# Patient Record
Sex: MALE | Race: WHITE | Employment: OTHER | URBAN - METROPOLITAN AREA
[De-identification: names, ages, dates, MRNs, and addresses within clinical notes are randomized per-mention and may not be internally consistent; named-entity substitution may affect disease eponyms.]

---

## 2018-05-23 ENCOUNTER — HOSPITAL ENCOUNTER (OUTPATIENT)
Dept: CT IMAGING | Age: 73
Discharge: HOME | End: 2018-05-23
Attending: INTERNAL MEDICINE
Payer: MEDICARE

## 2018-05-23 DIAGNOSIS — R19.7 DIARRHEA: ICD-10-CM

## 2018-05-23 DIAGNOSIS — R63.0 ANOREXIA: ICD-10-CM

## 2018-05-23 DIAGNOSIS — F17.200 NICOTINE DEPENDENCE: ICD-10-CM

## 2018-05-23 DIAGNOSIS — R63.4 ABNORMAL WEIGHT LOSS: ICD-10-CM

## 2018-05-23 LAB — CREAT UR-MCNC: 0.8 MG/DL (ref 0.6–1.3)

## 2018-05-23 PROCEDURE — 74011636320 HC RX REV CODE- 636/320: Performed by: INTERNAL MEDICINE

## 2018-05-23 PROCEDURE — 74177 CT ABD & PELVIS W/CONTRAST: CPT

## 2018-05-23 PROCEDURE — 82565 ASSAY OF CREATININE: CPT

## 2018-05-23 RX ADMIN — IOPAMIDOL 100 ML: 612 INJECTION, SOLUTION INTRAVENOUS at 15:00

## 2018-06-29 ENCOUNTER — HOSPITAL ENCOUNTER (OUTPATIENT)
Dept: GENERAL RADIOLOGY | Age: 73
Discharge: HOME | End: 2018-06-29
Attending: INTERNAL MEDICINE
Payer: MEDICARE

## 2018-06-29 DIAGNOSIS — Z86.010 PERSONAL HISTORY OF COLONIC POLYPS: ICD-10-CM

## 2018-06-29 DIAGNOSIS — R93.3 ABNORMAL FINDING ON GI TRACT IMAGING: ICD-10-CM

## 2018-06-29 PROCEDURE — 74011000255 HC RX REV CODE- 255: Performed by: INTERNAL MEDICINE

## 2018-06-29 PROCEDURE — 74011000250 HC RX REV CODE- 250: Performed by: INTERNAL MEDICINE

## 2018-06-29 PROCEDURE — 74247 XR UPPER GI W KUB AIR CONT: CPT

## 2018-06-29 RX ADMIN — ANTACID/ANTIFLATULENT 8 G: 380; 550; 10; 10 GRANULE, EFFERVESCENT ORAL at 08:00

## 2018-06-29 RX ADMIN — BARIUM SULFATE 135 ML: 980 POWDER, FOR SUSPENSION ORAL at 08:00

## 2018-06-29 RX ADMIN — BARIUM SULFATE 30 G: 960 POWDER, FOR SUSPENSION ORAL at 08:00

## 2018-06-29 RX ADMIN — BARIUM SULFATE 700 MG: 700 TABLET ORAL at 08:00

## 2018-08-29 ENCOUNTER — ANESTHESIA EVENT (OUTPATIENT)
Dept: ENDOSCOPY | Age: 73
End: 2018-08-29
Payer: MEDICARE

## 2018-08-30 ENCOUNTER — HOSPITAL ENCOUNTER (OUTPATIENT)
Age: 73
Setting detail: OUTPATIENT SURGERY
Discharge: HOME | End: 2018-08-30
Attending: INTERNAL MEDICINE | Admitting: INTERNAL MEDICINE
Payer: MEDICARE

## 2018-08-30 ENCOUNTER — ANESTHESIA (OUTPATIENT)
Dept: ENDOSCOPY | Age: 73
End: 2018-08-30
Payer: MEDICARE

## 2018-08-30 VITALS
HEART RATE: 67 BPM | DIASTOLIC BLOOD PRESSURE: 73 MMHG | RESPIRATION RATE: 19 BRPM | SYSTOLIC BLOOD PRESSURE: 153 MMHG | BODY MASS INDEX: 29.54 KG/M2 | TEMPERATURE: 98 F | OXYGEN SATURATION: 98 % | WEIGHT: 211 LBS | HEIGHT: 71 IN

## 2018-08-30 LAB — GLUCOSE BLD STRIP.AUTO-MCNC: 116 MG/DL (ref 70–110)

## 2018-08-30 PROCEDURE — 82962 GLUCOSE BLOOD TEST: CPT

## 2018-08-30 PROCEDURE — 77030014179 HC APPL CLP RESOL BSC -C: Performed by: INTERNAL MEDICINE

## 2018-08-30 PROCEDURE — 74011000250 HC RX REV CODE- 250: Performed by: NURSE ANESTHETIST, CERTIFIED REGISTERED

## 2018-08-30 PROCEDURE — 77030009426 HC FCPS BIOP ENDOSC BSC -B: Performed by: INTERNAL MEDICINE

## 2018-08-30 PROCEDURE — 76060000032 HC ANESTHESIA 0.5 TO 1 HR: Performed by: INTERNAL MEDICINE

## 2018-08-30 PROCEDURE — 74011250636 HC RX REV CODE- 250/636

## 2018-08-30 PROCEDURE — 74011250636 HC RX REV CODE- 250/636: Performed by: NURSE ANESTHETIST, CERTIFIED REGISTERED

## 2018-08-30 PROCEDURE — 76040000007: Performed by: INTERNAL MEDICINE

## 2018-08-30 PROCEDURE — 88305 TISSUE EXAM BY PATHOLOGIST: CPT | Performed by: INTERNAL MEDICINE

## 2018-08-30 PROCEDURE — 77030013992 HC SNR POLYP ENDOSC BSC -B: Performed by: INTERNAL MEDICINE

## 2018-08-30 RX ORDER — LIDOCAINE HYDROCHLORIDE 20 MG/ML
INJECTION, SOLUTION EPIDURAL; INFILTRATION; INTRACAUDAL; PERINEURAL AS NEEDED
Status: DISCONTINUED | OUTPATIENT
Start: 2018-08-30 | End: 2018-08-30 | Stop reason: HOSPADM

## 2018-08-30 RX ORDER — PROPOFOL 10 MG/ML
INJECTION, EMULSION INTRAVENOUS AS NEEDED
Status: DISCONTINUED | OUTPATIENT
Start: 2018-08-30 | End: 2018-08-30 | Stop reason: HOSPADM

## 2018-08-30 RX ORDER — METFORMIN HYDROCHLORIDE 500 MG/1
500 TABLET ORAL 2 TIMES DAILY WITH MEALS
COMMUNITY

## 2018-08-30 RX ORDER — SODIUM CHLORIDE, SODIUM LACTATE, POTASSIUM CHLORIDE, CALCIUM CHLORIDE 600; 310; 30; 20 MG/100ML; MG/100ML; MG/100ML; MG/100ML
75 INJECTION, SOLUTION INTRAVENOUS CONTINUOUS
Status: DISCONTINUED | OUTPATIENT
Start: 2018-08-30 | End: 2018-08-30 | Stop reason: HOSPADM

## 2018-08-30 RX ORDER — SODIUM CHLORIDE 0.9 % (FLUSH) 0.9 %
5-10 SYRINGE (ML) INJECTION AS NEEDED
Status: DISCONTINUED | OUTPATIENT
Start: 2018-08-30 | End: 2018-08-30 | Stop reason: HOSPADM

## 2018-08-30 RX ORDER — SODIUM CHLORIDE 0.9 % (FLUSH) 0.9 %
5-10 SYRINGE (ML) INJECTION EVERY 8 HOURS
Status: DISCONTINUED | OUTPATIENT
Start: 2018-08-30 | End: 2018-08-30 | Stop reason: HOSPADM

## 2018-08-30 RX ADMIN — PROPOFOL 50 MG: 10 INJECTION, EMULSION INTRAVENOUS at 13:48

## 2018-08-30 RX ADMIN — SODIUM CHLORIDE, SODIUM LACTATE, POTASSIUM CHLORIDE, AND CALCIUM CHLORIDE 75 ML/HR: 600; 310; 30; 20 INJECTION, SOLUTION INTRAVENOUS at 12:24

## 2018-08-30 RX ADMIN — PROPOFOL 50 MG: 10 INJECTION, EMULSION INTRAVENOUS at 14:00

## 2018-08-30 RX ADMIN — PROPOFOL 50 MG: 10 INJECTION, EMULSION INTRAVENOUS at 13:57

## 2018-08-30 RX ADMIN — FAMOTIDINE 20 MG: 10 INJECTION, SOLUTION INTRAVENOUS at 12:24

## 2018-08-30 RX ADMIN — PROPOFOL 50 MG: 10 INJECTION, EMULSION INTRAVENOUS at 14:22

## 2018-08-30 RX ADMIN — PROPOFOL 50 MG: 10 INJECTION, EMULSION INTRAVENOUS at 13:45

## 2018-08-30 RX ADMIN — PROPOFOL 50 MG: 10 INJECTION, EMULSION INTRAVENOUS at 14:10

## 2018-08-30 RX ADMIN — PROPOFOL 50 MG: 10 INJECTION, EMULSION INTRAVENOUS at 13:52

## 2018-08-30 RX ADMIN — LIDOCAINE HYDROCHLORIDE 40 MG: 20 INJECTION, SOLUTION EPIDURAL; INFILTRATION; INTRACAUDAL; PERINEURAL at 13:45

## 2018-08-30 RX ADMIN — PROPOFOL 50 MG: 10 INJECTION, EMULSION INTRAVENOUS at 14:05

## 2018-08-30 RX ADMIN — PROPOFOL 50 MG: 10 INJECTION, EMULSION INTRAVENOUS at 13:50

## 2018-08-30 NOTE — IP AVS SNAPSHOT
[address] [facility] 
 
 
 [address] [id number] [phone] Patient: [facility] MRN: [id number] ADT:6/4/9698 About your hospitalization You were admitted on:  August 30, 2018 You last received care in the:  [facility] You were discharged on:  August 30, 2018 Why you were hospitalized Your primary diagnosis was:  Not on File Follow-up Information Follow up With Details Comments Contact Info Keven Peterson MD   [address] [place] [address] [address] [facility] 
[phone] Martina Balbuena MD   [address] [address] 
[phone] Discharge Orders None A check louie indicates which time of day the medication should be taken. My Medications CONTINUE taking these medications Instructions Each Dose to Equal  
 Morning Noon Evening Bedtime  
 allopurinol 100 mg tablet Commonly known as:  Gaylan Elders Your last dose was: Your next dose is: Take 100 mg by mouth daily. 100 mg  
    
   
   
   
  
 aspirin 81 mg tablet Your last dose was: Your next dose is: Take 81 mg by mouth. 81 mg  
    
   
   
   
  
 atorvastatin 10 mg tablet Commonly known as:  LIPITOR Your last dose was: Your next dose is: Take 10 mg by mouth daily. 10 mg  
    
   
   
   
  
 CRESTOR 10 mg tablet Generic drug:  rosuvastatin Your last dose was: Your next dose is: Take 10 mg by mouth nightly. 10 mg  
    
   
   
   
  
 metFORMIN 500 mg tablet Commonly known as:  GLUCOPHAGE Your last dose was: Your next dose is: Take 500 mg by mouth two (2) times daily (with meals). Indications: 1/2 tablet in AM, 1 tablet in PM  
 500 mg Discharge Instructions Colonoscopy: What to Expect at [facility] Your Recovery After you have a colonoscopy, you will stay at the clinic for 1 to 2 hours until the medicines wear off. Then you can go home. But you will need to arrange for a ride. Your doctor will tell you when you can eat and do your other usual activities. Your doctor will talk to you about when you will need your next colonoscopy. Your doctor can help you decide how often you need to be checked. This will depend on the results of your test and your risk for colorectal cancer. After the test, you may be bloated or have gas pains. You may need to pass gas. If a biopsy was done or a polyp was removed, you may have streaks of blood in your stool (feces) for a few days. This care sheet gives you a general idea about how long it will take for you to recover. But each person recovers at a different pace. Follow the steps below to get better as quickly as possible. How can you care for yourself at home? Activity · Rest when you feel tired. · You can do your normal activities when it feels okay to do so. Diet · Follow your doctor's directions for eating. · Unless your doctor has told you not to, drink plenty of fluids. This helps to replace the fluids that were lost during the colon prep. · Do not drink alcohol. Medicines · If polyps were removed or a biopsy was done during the test, your doctor may tell you not to take aspirin or other anti-inflammatory medicines for a few days. These include ibuprofen (Advil, Motrin) and naproxen (Aleve). Other instructions · For your safety, do not drive or operate machinery until the medicine wears off and you can think clearly. Your doctor may tell you not to drive or operate machinery until the day after your test. 
· Do not sign legal documents or make major decisions until the medicine wears off and you can think clearly. The anesthesia can make it hard for you to fully understand what you are agreeing to. Follow-up care is a key part of your treatment and safety. Be sure to make and go to all appointments, and call your doctor if you are having problems. It's also a good idea to know your test results and keep a list of the medicines you take. When should you call for help? Call 911 anytime you think you may need emergency care. For example, call if: 
· You passed out (lost consciousness). · You pass maroon or bloody stools. · You have severe belly pain. Call your doctor now or seek immediate medical care if: 
· Your stools are black and tarlike. · Your stools have streaks of blood, but you did not have a biopsy or any polyps removed. · You have belly pain, or your belly is swollen and firm. · You vomit. · You have a fever. · You are very dizzy. Watch closely for changes in your health, and be sure to contact your doctor if you have any problems. Where can you learn more? Go to Keoghs.be Enter E264 in the search box to learn more about \"Colonoscopy: What to Expect at Home. \"  
© 9797-0268 Healthwise, Incorporated. Care instructions adapted under license by [facility] [employer] (which disclaims liability or warranty for this information). This care instruction is for use with your licensed healthcare professional. If you have questions about a medical condition or this instruction, always ask your healthcare professional. [place] [address] any warranty or liability for your use of this information. Content Version: 07.3.506740; Current as of: November 14, 2014 Colon Polyps: Care Instructions Your Care Instructions Colon polyps are growths in the colon or the rectum. The cause of most colon polyps is not known, and most people who get them do not have any problems. But a certain kind can turn into cancer. For this reason, regular testing for colon polyps is important for people age 48 and older and anyone who has an increased risk for colon cancer. Polyps are usually found through routine colon cancer screening tests. Although most colon polyps are not cancerous, they are usually removed and then tested for cancer. Screening for colon cancer saves lives because the cancer can usually be cured if it is caught early. If you have a polyp that is the type that can turn into cancer, you may need more tests to examine your entire colon. The doctor will remove any other polyps that he or she finds, and you will be tested more often. Follow-up care is a key part of your treatment and safety. Be sure to make and go to all appointments, and call your doctor if you are having problems. It's also a good idea to know your test results and keep a list of the medicines you take. How can you care for yourself at home? Regular exams to look for colon polyps are the best way to prevent polyps from turning into colon cancer. These can include stool tests, sigmoidoscopy, colonoscopy, and CT colonography. Talk with your doctor about a testing schedule that is right for you. To prevent polyps There is no home treatment that can prevent colon polyps. But these steps may help lower your risk for cancer. · Stay active. Being active can help you get to and stay at a healthy weight. Try to exercise on most days of the week. Walking is a good choice. · Eat well. Choose a variety of vegetables, fruits, legumes (such as peas and beans), fish, poultry, and whole grains. · Do not smoke. If you need help quitting, talk to your doctor about stop-smoking programs and medicines. These can increase your chances of quitting for good. · If you drink alcohol, limit how much you drink. Limit alcohol to 2 drinks a day for men and 1 drink a day for women. When should you call for help?  
Call your doctor now or seek immediate medical care if: 
  · You have severe belly pain.  
  · Your stools are maroon or very bloody.  
 Watch closely for changes in your health, and be sure to contact your doctor if: 
  · You have a fever.  
  · You have nausea or vomiting.  
  · You have a change in bowel habits (new constipation or diarrhea).  
  · Your symptoms get worse or are not improving as expected. Where can you learn more? Go to http://onrma-meri.info/. Enter 95 [id number] in the search box to learn more about \"Colon Polyps: Care Instructions. \" Current as of: May 12, 2017 Content Version: 11.7 © 9259-1927 Pressi. Care instructions adapted under license by [employer] (which disclaims liability or warranty for this information). If you have questions about a medical condition or this instruction, always ask your healthcare professional. [place] [address] any warranty or liability for your use of this information. DISCHARGE SUMMARY from Nurse POST-PROCEDURE INSTRUCTIONS: 
 
Call your Physician if you: 
? Observe any excess bleeding. ? Develop a temperature over 100.5o F. 
? Experience abdominal, shoulder or chest pain. ? Notice any signs of decreased circulation or nerve impairment to an extremity such as a change in color, persistent numbness, tingling, coldness or increase in pain. ? Vomit blood or you have nausea and vomiting lasting longer than 4 hours. ? Are unable to take medications. ? Are unable to urinate within 8 hours after discharge following general anesthesia or intravenous sedation. For the next 24 hours after receiving general anesthesia or intravenous sedation, or while taking prescription Narcotics, limit your activities: 
? Do NOT drive a motor vehicle, operate hazard machinery or power tools, or perform tasks that require coordination. The medication you received during your procedure may have some effect on your mental awareness. ? Do NOT make important personal or business decisions. The medication you received during your procedure may have some effect on your mental awareness. ? Do NOT drink alcoholic beverages. These drinks do not mix well with the medications that have been given to you. ? Upon discharge from the hospital, you must be accompanied by a responsible adult. ? Resume your diet as directed by your physician. ? Resume medications as your physician has prescribed. ? Please give a list of your current medications to your Primary Care Provider. ? Please update this list whenever your medications are discontinued, doses are changed, or new medications (including over-the-counter products) are added. ? Please carry medication information at all times in case of emergency situations. These are general instructions for a healthy lifestyle: No smoking/ No tobacco products/ Avoid exposure to second hand smoke. ? Surgeon General's Warning:  Quitting smoking now greatly reduces serious risk to your health. Obesity, smoking, and a sedentary lifestyle greatly increase your risk for illness. ? A healthy diet, regular physical exercise & weight monitoring are important for maintaining a healthy lifestyle ? You may be retaining fluid if you have a history of heart failure or if you experience any of the following symptoms:  Weight gain of 3 pounds or more overnight or 5 pounds in a week, increased swelling in our hands or feet or shortness of breath while lying flat in bed. Please call your doctor as soon as you notice any of these symptoms; do not wait until your next office visit. Recognize signs and symptoms of STROKE: 
F  -  Face looks uneven A  -  Arms unable to move or move unevenly S  -  Speech slurred or non-existent T  -  Time to call 911 - as soon as signs and symptoms begin - DO NOT go back to bed or wait to see If you get better - TIME IS BRAIN. Colorectal Screening ? Colorectal cancer almost always develops from precancerous polyps (abnormal growths) in the colon or rectum.   Screening tests can find precancerous polyps, so that they can be removed before they turn into cancer. Screening tests can also find colorectal cancer early, when treatment works best. 
? Speak with your physician about when you should begin screening and how often you should be tested. Additional Information If you have questions, please call [phone]. Remember, [employer] is NOT to be used for urgent needs. For medical emergencies, dial 911. Educational references and/or instructions provided during this visit included: 
 
Colon Polyps APPOINTMENTS: 
 
Please make a follow-up appointment with your physician. Discharge information has been reviewed with the patient and family. The patient verbalized understanding. Introducing [facility]! Ramin Day introduces [employer] patient portal. Now you can access parts of your medical record, email your doctor's office, and request medication refills online. 1. In your internet browser, go to [email or web address] 2. Click on the First Time User? Click Here link in the Sign In box. You will see the New Member Sign Up page. 3. Enter your [employer] Access Code exactly as it appears below. You will not need to use this code after youve completed the sign-up process. If you do not sign up before the expiration date, you must request a new code. · [employer] Access Code: [email or web address] Expires: 11/28/2018  2:37 PM 
 
4. Enter the last four digits of your Social Security Number (xxxx) and Date of Birth (mm/dd/yyyy) as indicated and click Submit. You will be taken to the next sign-up page. 5. Create a Adzunat ID. This will be your [employer] login ID and cannot be changed, so think of one that is secure and easy to remember. 6. Create a [employer] password. You can change your password at any time. 7. Enter your Password Reset Question and Answer. This can be used at a later time if you forget your password. 8. Enter your e-mail address. You will receive e-mail notification when new information is available in [address] 9. Click Sign Up. You can now view and download portions of your medical record. 10. Click the Download Summary menu link to download a portable copy of your medical information. If you have questions, please visit the Frequently Asked Questions section of the D[employer] website. Remember, [employer] is NOT to be used for urgent needs. For medical emergencies, dial 911. Now available from your iPhone and Android! Introducing Alexandro Dolan As a [facility] patient, I wanted to make you aware of our electronic visit tool called Alexandro Dolan. Rachel ClipMinemichelle 24/7 allows you to connect within minutes with a medical provider 24 hours a day, seven days a week via a mobile device or tablet or logging into a secure website from your computer. You can access Alexandro Dolan from anywhere in the [place]. A virtual visit might be right for you when you have a simple condition and feel like you just dont want to get out of bed, or cant get away from work for an appointment, when your regular [facility] provider is not available (evenings, weekends or holidays), or when youre out of town and need minor care. Electronic visits cost only $49 and if the [facility] No 24/7 provider determines a prescription is needed to treat your condition, one can be electronically transmitted to a nearby pharmacy*. Please take a moment to enroll today if you have not already done so. The enrollment process is free and takes just a few minutes. To enroll, please download the [employer]/compropago edison to your tablet or phone, or visit [email or web address] to enroll on your computer.    
And, as an [address] [place] [address] patient with a Magine account, the results of your visits will be scanned into your electronic medical record and your primary care provider will be able to view the scanned results. We urge you to continue to see your regular [employer] provider for your ongoing medical care. And while your primary care provider may not be the one available when you seek a Nitch virtual visit, the peace of mind you get from getting a real diagnosis real time can be priceless. For more information on Nitch, view our Frequently Asked Questions (FAQs) at [email or web address]. Sincerely, 
 
Cristino Blankenship MD 
Chief Medical Officer [address] *:  certain medications cannot be prescribed via Nitch Providers Seen During Your Hospitalization Provider Specialty Primary office phone Funmilayo Earl MD Gastroenterology [phone] Your Primary Care Physician (PCP) Primary Care Physician Office Phone Office Fax Vu Zamorano [phone] You are allergic to the following No active allergies Recent Documentation Height Weight BMI Smoking Status 1.803 m 95.7 kg 29.43 kg/m2 Former Smoker Emergency Contacts Name Discharge Info Relation Home Work Mobile Shaniqua Wang DISCHARGE CAREGIVER [3] Child [2] [phone] Mira Deal N/A  AT THIS TIME [6] Friend [5] [phone] Patient Belongings The following personal items are in your possession at time of discharge: 
  Dental Appliances: Partials, Lowers, Uppers  Visual Aid: None Please provide this summary of care documentation to your next provider. Signatures-by signing, you are acknowledging that this After Visit Summary has been reviewed with you and you have received a copy. Patient Signature:  ____________________________________________________________ Date:  ____________________________________________________________  
  
[facility]  Provider Signature: ____________________________________________________________ Date:  ____________________________________________________________

## 2018-08-30 NOTE — ANESTHESIA PREPROCEDURE EVALUATION
Anesthetic History No history of anesthetic complications Review of Systems / Medical History Patient summary reviewed and pertinent labs reviewed Pulmonary Within defined limits Sleep apnea Neuro/Psych Within defined limits Cardiovascular Within defined limits Exercise tolerance: >4 METS 
  
GI/Hepatic/Renal 
Within defined limits GERD Endo/Other Within defined limits Other Findings Comments: Documentation of current medication Current medications obtained, documented and obtained? YES Risk Factors for Postoperative nausea/vomiting: 
     History of postoperative nausea/vomiting? NO Female? NO Motion sickness? NO Intended opioid administration for postoperative analgesia? NO Smoking Abstinence: 
Current Smoker? NO Elective Surgery? YES Seen preoperatively by anesthesiologist or proxy prior to day of surgery? YES Pt abstained from smoking 24 hours prior to anesthesia? YES Preventive care/screening for High Blood Pressure: 
Aged 18 years and older: YES Screened for high blood pressure: YES Patients with high blood pressure referred to primary care provider 
 for BP management: YES Physical Exam 
 
Airway Mallampati: II 
TM Distance: 4 - 6 cm Neck ROM: normal range of motion Mouth opening: Normal 
 
 Cardiovascular Rhythm: regular Rate: normal 
 
 
 
 Dental 
 
Dentition: Upper partial plate and Lower partial plate Pulmonary Breath sounds clear to auscultation Abdominal 
GI exam deferred Other Findings Anesthetic Plan ASA: 3 Anesthesia type: MAC Induction: Intravenous Anesthetic plan and risks discussed with: Patient

## 2018-08-30 NOTE — PROCEDURES
[email or web address]  [phone]        Brief Procedure Note    Livan Calvin  1945  [id number]    Date of Procedure: 8/30/2018    Preoperative diagnosis:   V12.72 - Z86.010, Personal history of colon polyps    Postoperative diagnosis: ascending polyp x 1, hepatic flexure polyps x 2 with single endoclip , transverse polyps x 3, descending polyps x 2 , multiple rectal polyps (4 removed for sampling), diverticulosis, internal and external hemorrhoids    Description of Findings: same    Sedation/Anesthesia: Monitored Anesthesia Care; See Anesthesia Note    Procedure: Procedure(s):  COLONOSCOPY with polypectomies and biopsy with clip     :  Dr. Joaquin Jacinto MD    Assistant(s): Endoscopy Technician-1: dAalid Mcqueen RN-1: Sonny Lyons RN  Endoscopy RN-2: Verónica Moreno RN    EBL:None    Specimens:   ID Type Source Tests Collected by Time Destination   1 : ascending polyp Preservative Colon, Ascending  Joaquin Jacinto MD 8/30/2018 1357 Pathology   2 : hepatic flexure polyps Preservative Hepatic Flexure  Joaquin Jacinto MD 8/30/2018 1400 Pathology   3 : transverse polyps Preservative Colon, Transverse  Joaquin Jacinto MD 8/30/2018 1404 Pathology   4 : descending polyps Preservative Colon, Descending  Joaquin Jacinto MD 8/30/2018 1410 Pathology   5 : rectum polyps Preservative Rectum  Joaquin Jacinto MD 8/30/2018 1419 Pathology       Findings: See printed and scanned procedure note    Complications: None    Dr. Joaquin Jacinto MD  8/30/2018  2:30 PM    Joaquin Jacinto MD  [facility] [address] [phone]  [email or web address]

## 2018-08-30 NOTE — ANESTHESIA POSTPROCEDURE EVALUATION
Post-Anesthesia Evaluation and Assessment Patient: Vero Richter MRN: [id number]  SSN: [id number] YOB: 1945  Age: 75 y.o. Sex: male Cardiovascular Function/Vital Signs Visit Vitals  /55  Pulse 73  Temp 36.7 °C (98 °F)  Resp 12  Ht 5' 11\" (1.803 m)  Wt 95.7 kg (211 lb)  SpO2 100%  BMI 29.43 kg/m2 Patient is status post MAC anesthesia for Procedure(s): 
COLONOSCOPY with polypectomies and biopsy with clip . Nausea/Vomiting: None Postoperative hydration reviewed and adequate. Pain: 
Pain Scale 1: Visual (08/30/18 1428) Pain Intensity 1: 0 (08/30/18 1428) Managed Neurological Status: At baseline Mental Status and Level of Consciousness: Arousable Pulmonary Status:  
O2 Device: Room air (08/30/18 1429) Adequate oxygenation and airway patent Complications related to anesthesia: None Post-anesthesia assessment completed. No concerns Signed By: Bita Nicolas MD   
 August 30, 2018

## 2018-08-30 NOTE — DISCHARGE INSTRUCTIONS
Colonoscopy: What to Expect at [address] [place] [address]  After you have a colonoscopy, you will stay at the clinic for 1 to 2 hours until the medicines wear off. Then you can go home. But you will need to arrange for a ride. Your doctor will tell you when you can eat and do your other usual activities. Your doctor will talk to you about when you will need your next colonoscopy. Your doctor can help you decide how often you need to be checked. This will depend on the results of your test and your risk for colorectal cancer. After the test, you may be bloated or have gas pains. You may need to pass gas. If a biopsy was done or a polyp was removed, you may have streaks of blood in your stool (feces) for a few days. This care sheet gives you a general idea about how long it will take for you to recover. But each person recovers at a different pace. Follow the steps below to get better as quickly as possible. How can you care for yourself at home? Activity  · Rest when you feel tired. · You can do your normal activities when it feels okay to do so. Diet  · Follow your doctor's directions for eating. · Unless your doctor has told you not to, drink plenty of fluids. This helps to replace the fluids that were lost during the colon prep. · Do not drink alcohol. Medicines  · If polyps were removed or a biopsy was done during the test, your doctor may tell you not to take aspirin or other anti-inflammatory medicines for a few days. These include ibuprofen (Advil, Motrin) and naproxen (Aleve). Other instructions  · For your safety, do not drive or operate machinery until the medicine wears off and you can think clearly. Your doctor may tell you not to drive or operate machinery until the day after your test.  · Do not sign legal documents or make major decisions until the medicine wears off and you can think clearly. The anesthesia can make it hard for you to fully understand what you are agreeing to.   Follow-up care is a key part of your treatment and safety. Be sure to make and go to all appointments, and call your doctor if you are having problems. It's also a good idea to know your test results and keep a list of the medicines you take. When should you call for help? Call 911 anytime you think you may need emergency care. For example, call if:  · You passed out (lost consciousness). · You pass maroon or bloody stools. · You have severe belly pain. Call your doctor now or seek immediate medical care if:  · Your stools are black and tarlike. · Your stools have streaks of blood, but you did not have a biopsy or any polyps removed. · You have belly pain, or your belly is swollen and firm. · You vomit. · You have a fever. · You are very dizzy. Watch closely for changes in your health, and be sure to contact your doctor if you have any problems. Where can you learn more? Go to Invincea.be  Enter E264 in the search box to learn more about \"Colonoscopy: What to Expect at Home. \"   © 3641-9621 [employer], [employer]. Care instructions adapted under license by Vandana Stevenson (which disclaims liability or warranty for this information). This care instruction is for use with your licensed healthcare professional. If you have questions about a medical condition or this instruction, always ask your healthcare professional. [address] any warranty or liability for your use of this information. Content Version: 03.0.307461; Current as of: November 14, 2014     Colon Polyps: Care Instructions  Your Care Instructions    Colon polyps are growths in the colon or the rectum. The cause of most colon polyps is not known, and most people who get them do not have any problems. But a certain kind can turn into cancer. For this reason, regular testing for colon polyps is important for people age 48 and older and anyone who has an increased risk for colon cancer.   Polyps are usually found through routine colon cancer screening tests. Although most colon polyps are not cancerous, they are usually removed and then tested for cancer. Screening for colon cancer saves lives because the cancer can usually be cured if it is caught early. If you have a polyp that is the type that can turn into cancer, you may need more tests to examine your entire colon. The doctor will remove any other polyps that he or she finds, and you will be tested more often. Follow-up care is a key part of your treatment and safety. Be sure to make and go to all appointments, and call your doctor if you are having problems. It's also a good idea to know your test results and keep a list of the medicines you take. How can you care for yourself at home? Regular exams to look for colon polyps are the best way to prevent polyps from turning into colon cancer. These can include stool tests, sigmoidoscopy, colonoscopy, and CT colonography. Talk with your doctor about a testing schedule that is right for you. To prevent polyps  There is no home treatment that can prevent colon polyps. But these steps may help lower your risk for cancer. · Stay active. Being active can help you get to and stay at a healthy weight. Try to exercise on most days of the week. Walking is a good choice. · Eat well. Choose a variety of vegetables, fruits, legumes (such as peas and beans), fish, poultry, and whole grains. · Do not smoke. If you need help quitting, talk to your doctor about stop-smoking programs and medicines. These can increase your chances of quitting for good. · If you drink alcohol, limit how much you drink. Limit alcohol to 2 drinks a day for men and 1 drink a day for women. When should you call for help?   Call your doctor now or seek immediate medical care if:    · You have severe belly pain.     · Your stools are maroon or very bloody.    Watch closely for changes in your health, and be sure to contact your doctor if:    · You have a fever.     · You have nausea or vomiting.     · You have a change in bowel habits (new constipation or diarrhea).     · Your symptoms get worse or are not improving as expected. Where can you learn more? Go to http://norma-meri.info/. Enter 95 [id number] in the search box to learn more about \"Colon Polyps: Care Instructions. \"  Current as of: May 12, 2017  Content Version: 11.7  © 6139-1466 [employer]. Care instructions adapted under license by [employer] (which disclaims liability or warranty for this information). If you have questions about a medical condition or this instruction, always ask your healthcare professional. [place] [address] any warranty or liability for your use of this information. DISCHARGE SUMMARY from Nurse     POST-PROCEDURE INSTRUCTIONS:    Call your Physician if you:  ? Observe any excess bleeding. ? Develop a temperature over 100.5o F.  ? Experience abdominal, shoulder or chest pain. ? Notice any signs of decreased circulation or nerve impairment to an extremity such as a change in color, persistent numbness, tingling, coldness or increase in pain. ? Vomit blood or you have nausea and vomiting lasting longer than 4 hours. ? Are unable to take medications. ? Are unable to urinate within 8 hours after discharge following general anesthesia or intravenous sedation. For the next 24 hours after receiving general anesthesia or intravenous sedation, or while taking prescription Narcotics, limit your activities:  ? Do NOT drive a motor vehicle, operate hazard machinery or power tools, or perform tasks that require coordination. The medication you received during your procedure may have some effect on your mental awareness. ? Do NOT make important personal or business decisions. The medication you received during your procedure may have some effect on your mental awareness. ? Do NOT drink alcoholic beverages.   These drinks do not mix well with the medications that have been given to you. ? Upon discharge from the hospital, you must be accompanied by a responsible adult. ? Resume your diet as directed by your physician. ? Resume medications as your physician has prescribed. ? Please give a list of your current medications to your Primary Care Provider. ? Please update this list whenever your medications are discontinued, doses are changed, or new medications (including over-the-counter products) are added. ? Please carry medication information at all times in case of emergency situations. These are general instructions for a healthy lifestyle:    No smoking/ No tobacco products/ Avoid exposure to second hand smoke.  Surgeon General's Warning:  Quitting smoking now greatly reduces serious risk to your health. Obesity, smoking, and a sedentary lifestyle greatly increase your risk for illness.  A healthy diet, regular physical exercise & weight monitoring are important for maintaining a healthy lifestyle   You may be retaining fluid if you have a history of heart failure or if you experience any of the following symptoms:  Weight gain of 3 pounds or more overnight or 5 pounds in a week, increased swelling in our hands or feet or shortness of breath while lying flat in bed. Please call your doctor as soon as you notice any of these symptoms; do not wait until your next office visit. Recognize signs and symptoms of STROKE:  F  -  Face looks uneven  A  -  Arms unable to move or move unevenly  S  -  Speech slurred or non-existent  T  -  Time to call 911 - as soon as signs and symptoms begin - DO NOT go back to bed or wait to see If you get better - TIME IS BRAIN. Colorectal Screening   Colorectal cancer almost always develops from precancerous polyps (abnormal growths) in the colon or rectum. Screening tests can find precancerous polyps, so that they can be removed before they turn into cancer.  Screening tests can also find colorectal cancer early, when treatment works best.  Cece Campbell Speak with your physician about when you should begin screening and how often you should be tested. Additional Information    If you have questions, please call [phone]. Remember, [employer] is NOT to be used for urgent needs. For medical emergencies, dial 911. Educational references and/or instructions provided during this visit included:    Colon Polyps      APPOINTMENTS:    Please make a follow-up appointment with your physician. Discharge information has been reviewed with the patient and family. The patient verbalized understanding.

## 2018-08-30 NOTE — H&P
[email or web address]  [phone]      History and Physical    Patient: Reeves Dakin MRN: [id number]  SSN: [id number]    YOB: 1945  Age: 75 y.o. Sex: male      Subjective: Reeves Dakin is a 75 y.o. male who presents for increased risk CRCS. Pt has a personal history of colon polyps. Denies symptoms or concerns. .     Past Medical History:   Diagnosis Date    GERD (gastroesophageal reflux disease)     Gout     Hypercholesterolemia     TAHIRA (obstructive sleep apnea)     does not use cpap machine     Past Surgical History:   Procedure Laterality Date    HX COLONOSCOPY        Family History   Problem Relation Age of Onset    Cancer Father 48     Lung    COPD Brother      Social History   Substance Use Topics    Smoking status: Former Smoker     Packs/day: 0.25     Years: 4.00     Quit date: 8/1/2006    Smokeless tobacco: Never Used      Comment: also smoked occasional cigars    Alcohol use No      Prior to Admission medications    Medication Sig Start Date End Date Taking? Authorizing Provider   allopurinol (ZYLOPRIM) 100 mg tablet Take 100 mg by mouth daily. Phys Other, MD   rosuvastatin (CRESTOR) 10 mg tablet Take 10 mg by mouth nightly. Historical Provider   atorvastatin (LIPITOR) 10 mg tablet Take 10 mg by mouth daily. Historical Provider   aspirin 81 mg tablet Take 81 mg by mouth. Historical Provider        No Known Allergies    Review of Systems:  A comprehensive review of systems was negative except for that written in the History of Present Illness. Objective: There were no vitals filed for this visit. Physical Exam:  GENERAL: alert, cooperative, no distress, appears stated age  LUNG: clear to auscultation bilaterally  HEART: regular rate and rhythm, S1, S2 normal, no murmur, click, rub or gallop  ABDOMEN: soft, non-tender. Bowel sounds normal. No masses,  no organomegaly  NEUROLOGIC: alert & oriented x 3    Assessment:     1.  Personal history of colon polyps    Plan:     1. Colonoscopy    Signed By: Rica Brown MD     August 30, 2018      Rica Brown MD  [facility] 1948, [address] [facility] - [phone]  [email or web address]

## 2020-08-21 ENCOUNTER — HOSPITAL ENCOUNTER (OUTPATIENT)
Dept: VASCULAR SURGERY | Age: 75
Discharge: HOME | End: 2020-08-21
Attending: INTERNAL MEDICINE
Payer: MEDICARE

## 2020-08-21 DIAGNOSIS — I73.9 PERIPHERAL VASCULAR DISEASE, UNSPECIFIED (HCC): ICD-10-CM

## 2020-08-21 LAB
LEFT BULB EDV: 9.1 CM/S
LEFT BULB PSV: 90.4 CM/S
LEFT CCA DIST DIAS: 11.4 CM/S
LEFT CCA DIST SYS: 90.4 CM/S
LEFT CCA MID DIAS: 19.01 CM/S
LEFT CCA MID SYS: 154.82 CM/S
LEFT CCA PROX DIAS: 24.1 CM/S
LEFT CCA PROX SYS: 167.6 CM/S
LEFT ICA DIST DIAS: 20.8 CM/S
LEFT ICA DIST SYS: 75.8 CM/S
LEFT ICA MID DIAS: 21.2 CM/S
LEFT ICA MID SYS: 82 CM/S
LEFT ICA PROX DIAS: 23 CM/S
LEFT ICA PROX SYS: 97.4 CM/S
LEFT ICA/CCA SYS: 1.08
LEFT VERTEBRAL DIAS: 11.15 CM/S
LEFT VERTEBRAL SYS: 46.7 CM/S
RIGHT BULB EDV: 9.5 CM/S
RIGHT BULB PSV: 62.8 CM/S
RIGHT CCA DIST DIAS: 7.6 CM/S
RIGHT CCA DIST SYS: 68.5 CM/S
RIGHT CCA MID DIAS: 11.13 CM/S
RIGHT CCA MID SYS: 99.13 CM/S
RIGHT CCA PROX DIAS: 0 CM/S
RIGHT CCA PROX SYS: 77.5 CM/S
RIGHT ICA DIST DIAS: 17.4 CM/S
RIGHT ICA DIST SYS: 87.9 CM/S
RIGHT ICA MID DIAS: 15.7 CM/S
RIGHT ICA MID SYS: 89.7 CM/S
RIGHT ICA PROX DIAS: 33.9 CM/S
RIGHT ICA PROX SYS: 143.6 CM/S
RIGHT ICA/CCA SYS: 2.1
RIGHT VERTEBRAL DIAS: 7.4 CM/S
RIGHT VERTEBRAL SYS: 39.1 CM/S

## 2020-08-21 PROCEDURE — 93880 EXTRACRANIAL BILAT STUDY: CPT

## 2020-12-18 ENCOUNTER — HOSPITAL ENCOUNTER (OUTPATIENT)
Dept: LAB | Age: 75
Discharge: HOME | End: 2020-12-18
Payer: MEDICARE

## 2020-12-18 ENCOUNTER — TRANSCRIBE ORDER (OUTPATIENT)
Dept: REGISTRATION | Age: 75
End: 2020-12-18

## 2020-12-18 DIAGNOSIS — M10.9 GOUT, UNSPECIFIED: ICD-10-CM

## 2020-12-18 DIAGNOSIS — E78.5 HLD (HYPERLIPIDEMIA): ICD-10-CM

## 2020-12-18 DIAGNOSIS — I10 HTN (HYPERTENSION): ICD-10-CM

## 2020-12-18 DIAGNOSIS — E03.9 HYPOTHYROIDISM, UNSPECIFIED: Primary | ICD-10-CM

## 2020-12-18 DIAGNOSIS — E11.65 TYPE 2 DIABETES MELLITUS WITH HYPERGLYCEMIA (HCC): ICD-10-CM

## 2020-12-18 DIAGNOSIS — E03.9 HYPOTHYROIDISM, UNSPECIFIED: ICD-10-CM

## 2020-12-18 LAB
ALBUMIN SERPL-MCNC: 3.8 G/DL (ref 3.4–5)
ALBUMIN/GLOB SERPL: 1.3 {RATIO} (ref 0.8–1.7)
ALP SERPL-CCNC: 83 U/L (ref 45–117)
ALT SERPL-CCNC: 65 U/L (ref 16–61)
ANION GAP SERPL CALC-SCNC: 5 MMOL/L (ref 3–18)
AST SERPL-CCNC: 52 U/L (ref 10–38)
BILIRUB SERPL-MCNC: 0.8 MG/DL (ref 0.2–1)
BUN SERPL-MCNC: 14 MG/DL (ref 7–18)
BUN/CREAT SERPL: 12 (ref 12–20)
CALCIUM SERPL-MCNC: 8.7 MG/DL (ref 8.5–10.1)
CHLORIDE SERPL-SCNC: 107 MMOL/L (ref 100–111)
CHOLEST SERPL-MCNC: 122 MG/DL
CO2 SERPL-SCNC: 27 MMOL/L (ref 21–32)
CREAT SERPL-MCNC: 1.13 MG/DL (ref 0.6–1.3)
GLOBULIN SER CALC-MCNC: 3 G/DL (ref 2–4)
GLUCOSE SERPL-MCNC: 245 MG/DL (ref 74–99)
HBA1C MFR BLD: 10.3 % (ref 4.2–5.6)
HDLC SERPL-MCNC: 35 MG/DL (ref 40–60)
HDLC SERPL: 3.5 {RATIO} (ref 0–5)
LDLC SERPL CALC-MCNC: 49 MG/DL (ref 0–100)
LIPID PROFILE,FLP: ABNORMAL
POTASSIUM SERPL-SCNC: 4.1 MMOL/L (ref 3.5–5.5)
PROT SERPL-MCNC: 6.8 G/DL (ref 6.4–8.2)
SODIUM SERPL-SCNC: 139 MMOL/L (ref 136–145)
T4 FREE SERPL-MCNC: 0.9 NG/DL (ref 0.7–1.5)
TRIGL SERPL-MCNC: 190 MG/DL (ref ?–150)
TSH SERPL DL<=0.05 MIU/L-ACNC: 2.72 UIU/ML (ref 0.36–3.74)
URATE SERPL-MCNC: 4.8 MG/DL (ref 2.6–7.2)
VLDLC SERPL CALC-MCNC: 38 MG/DL

## 2020-12-18 PROCEDURE — 36415 COLL VENOUS BLD VENIPUNCTURE: CPT

## 2020-12-18 PROCEDURE — 84550 ASSAY OF BLOOD/URIC ACID: CPT

## 2020-12-18 PROCEDURE — 80061 LIPID PANEL: CPT

## 2020-12-18 PROCEDURE — 84439 ASSAY OF FREE THYROXINE: CPT

## 2020-12-18 PROCEDURE — 80053 COMPREHEN METABOLIC PANEL: CPT

## 2020-12-18 PROCEDURE — 83036 HEMOGLOBIN GLYCOSYLATED A1C: CPT

## 2020-12-18 PROCEDURE — 84443 ASSAY THYROID STIM HORMONE: CPT

## 2022-08-02 ENCOUNTER — TRANSCRIBE ORDER (OUTPATIENT)
Dept: SCHEDULING | Age: 77
End: 2022-08-02

## 2022-08-02 DIAGNOSIS — I73.9 PERIPHERAL VASCULAR DISEASE, UNSPECIFIED (HCC): Primary | ICD-10-CM

## 2022-08-08 ENCOUNTER — HOSPITAL ENCOUNTER (OUTPATIENT)
Dept: VASCULAR SURGERY | Age: 77
Discharge: HOME | End: 2022-08-08
Attending: INTERNAL MEDICINE
Payer: MEDICARE

## 2022-08-08 DIAGNOSIS — I73.9 PERIPHERAL VASCULAR DISEASE, UNSPECIFIED (HCC): ICD-10-CM

## 2022-08-08 PROCEDURE — 93880 EXTRACRANIAL BILAT STUDY: CPT

## 2022-08-09 LAB
LEFT CCA DIST DIAS: 17.5 CM/S
LEFT CCA DIST SYS: 92.4 CM/S
LEFT CCA MID DIAS: 17.54 CM/S
LEFT CCA MID SYS: 123.98 CM/S
LEFT CCA PROX DIAS: 12.8 CM/S
LEFT CCA PROX SYS: 106.4 CM/S
LEFT ECA DIAS: 9.05 CM/S
LEFT ECA SYS: 103.5 CM/S
LEFT ICA DIST DIAS: 16.4 CM/S
LEFT ICA DIST SYS: 60.8 CM/S
LEFT ICA MID DIAS: 18.7 CM/S
LEFT ICA MID SYS: 65.5 CM/S
LEFT ICA PROX DIAS: 10.9 CM/S
LEFT ICA PROX SYS: 63.6 CM/S
LEFT ICA/CCA SYS: 0.71 NO UNITS
LEFT VERTEBRAL DIAS: 11.65 CM/S
LEFT VERTEBRAL SYS: 47.9 CM/S
RIGHT CCA DIST DIAS: 6.9 CM/S
RIGHT CCA DIST SYS: 56.1 CM/S
RIGHT CCA MID DIAS: 9.14 CM/S
RIGHT CCA MID SYS: 56.59 CM/S
RIGHT CCA PROX DIAS: 6.8 CM/S
RIGHT CCA PROX SYS: 69.3 CM/S
RIGHT ECA DIAS: 6.3 CM/S
RIGHT ECA SYS: 103.2 CM/S
RIGHT ICA DIST DIAS: 14.9 CM/S
RIGHT ICA DIST SYS: 84.4 CM/S
RIGHT ICA MID DIAS: 26.8 CM/S
RIGHT ICA MID SYS: 136.9 CM/S
RIGHT ICA PROX DIAS: 30.2 CM/S
RIGHT ICA PROX SYS: 139.5 CM/S
RIGHT ICA/CCA SYS: 2.5 NO UNITS
RIGHT VERTEBRAL DIAS: 10.61 CM/S
RIGHT VERTEBRAL SYS: 39.1 CM/S
VAS LEFT SUBCLAVIAN PROX EDV: 0 CM/S
VAS LEFT SUBCLAVIAN PROX PSV: 122.9 CM/S
VAS RIGHT SUBCLAVIAN PROX EDV: 0 CM/S
VAS RIGHT SUBCLAVIAN PROX PSV: 139.2 CM/S

## 2022-11-16 ENCOUNTER — HOSPITAL ENCOUNTER (OUTPATIENT)
Dept: CARDIAC REHAB | Age: 77
Discharge: HOME | End: 2022-11-16
Payer: MEDICARE

## 2022-11-16 VITALS — BODY MASS INDEX: 29.15 KG/M2 | WEIGHT: 209 LBS

## 2022-11-16 PROCEDURE — 93798 PHYS/QHP OP CAR RHAB W/ECG: CPT

## 2022-11-18 NOTE — PROGRESS NOTES
CARDIAC REHAB INITIAL ITP FOR REVIEW AND SIGNATURE    Ivy Vo 65 y.o. presented to cardiac rehab for an intake and a six minute walk test today with a primary diagnosis of CAD. Patient's EF has not been recently evaluated. Ivy Vo has a history of status post CABG. Cardiac risk factors include diabetes mellitus and these were reviewed with patient. Ivy Vo is not  and lives with lives alone. PHQ-9, depression score, is 4 and this is considered to be normal. The result was discussed with patient who confirms score to be accurate and if above a 5, a copy of the results were sent to patient's PCP. Patient denied chest pain or SOB during 6 minute walk and the cardiac rhythm was in Normal Sinus Rhythm with a Max HR of 92. Ivy Vo will attend exercise and educational sessions 3 days a week in cardiac rehab for 36 sessions. Exceptions noted during intake include HTN BP at peak of exercise. Goals for Rehab:    Patient name: Ivy Vo : 1945         Goals Comments   1. Maintain target heart rate (THR) during exercise by end of program    [x] initial  [] met                  [] not met  [] progressing  Pt will be able to exercise and maintain THR between 85-99 during each session to maximize cardiac benefits. 2. Minimize episodes of shortness of breath (SOB) or fatigue by end of program    [x] initial  [] met                  [] not met  [] progressing Pt will verbalize being able to complete their usual activities of daily living without feeling SOB or fatigued. 3. Increase endurance and stamina by end of program     [x] initial  [] met                  [] not met  [] progressing Pt will be able to complete 45 minutes of exercise without c/o shortness of breath or fatigue.      4. Incorporate Heart Healthy Lifestyle by end of program     [x] initial  [] met                  [] not met  [] progressing Pt will verbalize making healthy lifestyle changes that include but not limited to eating a heart healthy diet which includes whole foods such as fresh fruits and vegetables, incorporating cardiovascular exercise at home and stress management. Cardiac ITP 11/16/22 0835   Treatment Diagnosis   Treatment Diagnosis 1   (CAD)   Referral Date 11/04/22   Significant Cardiovascular History Previous CABG   Co-morbidities Diabetes   OXYGEN INTERVENTION   Oxygen Use No   Individual Treatment Plan   ITP Visit Type Initial Assessment   1st Date of Exercise  11/16/22   ITP Next Review Date 12/16/22   Visit #/Total Visits 1/36   EF %   (No EF available)   Risk Stratification High   ITP Exercise;Psychosocial;Tobacco;Nutrition;Education   Exercise    DASI Total Score 24.95   Stages of Change Preparation   Test Six minute walk test   Assisted Devices None   Data Measured Before Walk   Heart Rate 71   Blood Pressure 120/55   O2 Saturation 96   O2 Device Room air   RPD 0   RPE 0   Data Measured during Walk   Indicate Mode of RPE 6 minutes   RPE Data Measured During   Treadmill Grade 0 %   Any problems while exercising Denies   Do you have shortness of breath No   Describe type of pain you are having hip pain 2/10   Peak RPE 13   Peak RPD 0   Data Measured Immediately After Walk   Distance Walked in  ft   Distance Walked (ft) 847 ft   Heart Rate 82   Blood Pressure 154/81   RPE 0   O2 Saturation 97   Data Measured at 5 Minutes After Walk   Heart Rate 80   Blood Pressure 137/73   SpO2 97 %   Exercise Prescription   Mode Treadmill;Bike;Stepper; Rower; Other (comment); Ergometer   Frequency per week 2-3   Duration per session 31-55   Intensity  METS       2.22 - 4.2   RPE 11-13   Progression Initial   Symptoms with Exercise Other (comment)  (HTN at peak of exercise)   Target Heart Rate 85-99   Resistance Training Yes   Exercise Blood Pressures   Resting /55   Peak /81   Is BP WDL?  No   Exercise Activity at Home   Type none   Exercise Education Education Physically active; Warm up/Cool down;Equipment orientation;RPE scale;Signs/Symptoms to report; Exercise safety;Self pulse   Exercise Target Goal   Target Goal(s) Individual exercise RX;BP < 140/90 or < 130/80, if DM or CKD; Aerobic activity 30 + minutes/day  5 days/week   Psychosocial   Stages of Change Preparation   [facility] Total Score 21   PHQ 9 Score 4   Psychosocial Intervention   Interventions No intervention indicated   Currently Taking Psychotropic Meds No   Medication Changes No  (Initial)   Psychosocial Education   Education Signs/Symptoms of depression;Relaxation techniques; Impact self care behaviors on health; Environmental triggers; Coping techniques;Cardiac meds; Benefits of CPR completion; Advanced directives   Psychosocial Target Goals   Target Goal(s) Assess presence or absence of depression using a valid screening tool   Tobacco   Tobacco Use No   Smokeless Tobacco Use No   Nutrition   Stages of Change Preparation   Diabetes Yes   BG at Home Yes   Diabetes Med(s) Metformin   Diabetes Med(s) Change No  (Initial)   BG in Range? No  (180)   BG Frequency 2-3   Lipids   Date Lipids Drawn   (No recent panels)   Weight Management   Waist Circumference  38\"   Alcohol None   Nutrition Assessment Tool Rate Your Plate   Weight  54.3 kg (209 lb)   Height  5' 11\" (1.803 m)   BMI 29.21   Nutrition Education   Education Low fat & cholesterol diet;Carb-controlled diet; Low sodium diet; Healthy eating;Special diet   Nutrition Target Goals   Target Goals BMI less than 25; Waist size less than 40 inches males and less than 35 inches for females;Weight loss of 5-10%   Education   Learning Barrier Ready to learn   Education Intervention   Education Schedule Given Yes   Patient Education    Education CAD;Risk factors;Med Compliance;Cardiac A&P;Signs/Symptoms of Angina   Hypertension No   Is BP WDL?  No   Med(s) Change No  (Initial)   BP Meds No   ACE/ARB Prescribed No   ASA Prescribed Yes   BB Prescribed No   Statins Prescribed Yes   ASA Adherent Yes   Statin Intensity Moderate   Statins Adherent Yes   Education Target Goals   Target Goals Medication compliance; Risk factors; Understand target guidelines for lipids; Understand target guidelines for B/P   Treatment Goals   Goals Exercise   Exercise Goal Increase speed and endurance   Exercise Goal Status Initial       Exercise Log  11/16/22 0835   Rehab Common Questions   Any problems changes since your last visit Other (comment)   Any symptoms while exercising Denies   Psychosocial/Stress Level   (PHQ9: 4)   Patient Reported Glucose 180   Resting EKG rhythm SR   Tobacco Use None   Enter O2 Saturation and Liter Flow 96   ITP Next Review Date 12/16/22   Visit Number/Total Visits 1/36   What is plan for next session Exercise   On Call Medical Director Immediately Available [facility]   Exercise Treatment Log   Target Heart Rate(Range) 85-99   Resting HR 71   Resting /55   Recovery HR 80   Recovery /73   Weight 94.8 kg (209 lb)   Exercise EKG Rhythm SR   Exercise Duration 6   Peak HR 92   Peak /81   Peak RPE 13   Peak Mets 2.22   SOB 0   Angina 0   Claudulation 0   Asymptomatic 0   O2 Saturation 97   Total Minutes [address], [facility] 11/18/2022 8:55 AM

## 2022-11-21 ENCOUNTER — HOSPITAL ENCOUNTER (OUTPATIENT)
Dept: CARDIAC REHAB | Age: 77
Discharge: HOME | End: 2022-11-21
Payer: MEDICARE

## 2022-11-21 VITALS — WEIGHT: 209 LBS | BODY MASS INDEX: 29.15 KG/M2

## 2022-11-21 PROCEDURE — 93798 PHYS/QHP OP CAR RHAB W/ECG: CPT

## 2022-11-22 ENCOUNTER — HOSPITAL ENCOUNTER (OUTPATIENT)
Dept: CARDIAC REHAB | Age: 77
Discharge: HOME | End: 2022-11-22
Payer: MEDICARE

## 2022-11-22 VITALS — BODY MASS INDEX: 29.29 KG/M2 | WEIGHT: 210 LBS

## 2022-11-22 PROCEDURE — 93798 PHYS/QHP OP CAR RHAB W/ECG: CPT

## 2022-11-28 ENCOUNTER — HOSPITAL ENCOUNTER (OUTPATIENT)
Dept: CARDIAC REHAB | Age: 77
Discharge: HOME | End: 2022-11-28
Payer: MEDICARE

## 2022-11-28 VITALS — WEIGHT: 210 LBS | BODY MASS INDEX: 29.29 KG/M2

## 2022-11-28 PROCEDURE — 93798 PHYS/QHP OP CAR RHAB W/ECG: CPT

## 2022-11-30 ENCOUNTER — HOSPITAL ENCOUNTER (OUTPATIENT)
Dept: CARDIAC REHAB | Age: 77
Discharge: HOME | End: 2022-11-30
Payer: MEDICARE

## 2022-11-30 VITALS — WEIGHT: 211 LBS | BODY MASS INDEX: 29.43 KG/M2

## 2022-11-30 PROCEDURE — 93798 PHYS/QHP OP CAR RHAB W/ECG: CPT

## 2022-12-02 ENCOUNTER — HOSPITAL ENCOUNTER (OUTPATIENT)
Dept: CARDIAC REHAB | Age: 77
Discharge: HOME | End: 2022-12-02
Payer: MEDICARE

## 2022-12-02 VITALS — WEIGHT: 215 LBS | BODY MASS INDEX: 29.99 KG/M2

## 2022-12-02 PROCEDURE — 93798 PHYS/QHP OP CAR RHAB W/ECG: CPT

## 2022-12-05 ENCOUNTER — HOSPITAL ENCOUNTER (OUTPATIENT)
Dept: CARDIAC REHAB | Age: 77
Discharge: HOME | End: 2022-12-05
Payer: MEDICARE

## 2022-12-05 VITALS — BODY MASS INDEX: 30.13 KG/M2 | WEIGHT: 216 LBS

## 2022-12-05 PROCEDURE — 93798 PHYS/QHP OP CAR RHAB W/ECG: CPT

## 2022-12-07 ENCOUNTER — HOSPITAL ENCOUNTER (OUTPATIENT)
Dept: CARDIAC REHAB | Age: 77
Discharge: HOME | End: 2022-12-07
Payer: MEDICARE

## 2022-12-07 VITALS — WEIGHT: 216 LBS | BODY MASS INDEX: 30.13 KG/M2

## 2022-12-07 PROCEDURE — 93798 PHYS/QHP OP CAR RHAB W/ECG: CPT

## 2022-12-09 ENCOUNTER — HOSPITAL ENCOUNTER (OUTPATIENT)
Dept: CARDIAC REHAB | Age: 77
Discharge: HOME | End: 2022-12-09
Payer: MEDICARE

## 2022-12-09 VITALS — BODY MASS INDEX: 30.27 KG/M2 | WEIGHT: 217 LBS

## 2022-12-09 PROCEDURE — 93798 PHYS/QHP OP CAR RHAB W/ECG: CPT

## 2022-12-12 ENCOUNTER — HOSPITAL ENCOUNTER (OUTPATIENT)
Dept: CARDIAC REHAB | Age: 77
Discharge: HOME | End: 2022-12-12
Payer: MEDICARE

## 2022-12-12 VITALS — BODY MASS INDEX: 30.27 KG/M2 | WEIGHT: 217 LBS

## 2022-12-12 PROCEDURE — 93798 PHYS/QHP OP CAR RHAB W/ECG: CPT

## 2022-12-14 ENCOUNTER — APPOINTMENT (OUTPATIENT)
Dept: CARDIAC REHAB | Age: 77
End: 2022-12-14
Payer: MEDICARE

## 2022-12-16 ENCOUNTER — HOSPITAL ENCOUNTER (OUTPATIENT)
Dept: CARDIAC REHAB | Age: 77
Discharge: HOME | End: 2022-12-16
Payer: MEDICARE

## 2022-12-16 VITALS — BODY MASS INDEX: 30.13 KG/M2 | WEIGHT: 216 LBS

## 2022-12-16 PROCEDURE — 93798 PHYS/QHP OP CAR RHAB W/ECG: CPT

## 2022-12-16 NOTE — PROGRESS NOTES
[address] [facility] ITP REASSESSMENT FOR REVIEW AND SIGNATURE  Patient name: Ivy Vo : 1945     Visits from Start of Care:       Reporting Period: 2022 to 2022    Subjective Reports: Progressing well, no complaints at this time. Goals Comments   1. Maintain blood glucose (BG) control by next recert period    [] met                  [] not met  [x] progressing Pt BG is showing signs of improvement and better management. Continue to educate pt on benefits of exercise for BG control. 2. Continue improved blood pressure (BP) control by next recert period     [] met                  [] not met  [x] progressing Pt BP had been HTN before and after exercise, but is improving. Educate pt that consistent exercise, medication timing and BP monitoring can assist in managing. 3. Incorporate exercise daily by end of program     [] met                  [] not met  [x] progressing Pt to add daily exercise to his day, such as walking, light weights and resistance bands per tolerance. 4. Monitor sodium intake by next recert period   [] met                  [] not met  [x] progressing Pt to be educated on monitoring sodium consumption to assist in managing HTN . Diet with whole foods and less processed is recommended. Key functional changes: Pt has been able to complete 45 minutes of exercise over the past 4 sessions. Problems/ barriers to goal attainment: None noted. Assessment / Recommendations: Continue w/ rehab 3 times weekly to maximize cardiac benefits.        Cardiac ITP  22 0913   Treatment Diagnosis   Treatment Diagnosis 1   (CAD)   Referral Date 22   Significant Cardiovascular History Previous CABG   Co-morbidities Diabetes   OXYGEN INTERVENTION   Oxygen Use No   Individual Treatment Plan   ITP Visit Type Re-Assessment   1st Date of Exercise  22   ITP Next Review Date 23   Visit #/Total Visits    EF %   (No EF available)   Risk Stratification High   ITP Exercise;Psychosocial;Tobacco;Nutrition;Education   Exercise    Stages of Change Action   Assisted Devices None   Data Measured during Walk   Peak RPE 14   Peak RPD 0   Exercise Prescription   Mode Treadmill;Bike;Stepper; Rower; Other (comment); Ergometer   Frequency per week 2-3   Duration per session 31-55   Intensity  METS       2.22 - 4.2   RPE 11-13   Progression Initial   Symptoms with Exercise Other (comment)  (HTN at peak of exercise)   Target Heart Rate 85-99   Resistance Training Yes   Exercise Blood Pressures   Resting /63   Peak /67   Is BP WDL? Yes   Exercise Activity at Home   Type none   Exercise Education   Education Physically active; Warm up/Cool down;Equipment orientation;RPE scale;Signs/Symptoms to report; Exercise safety;Self pulse   Exercise Target Goal   Target Goal(s) Individual exercise RX;BP < 140/90 or < 130/80, if DM or CKD; Aerobic activity 30 + minutes/day  5 days/week   Psychosocial   Stages of Change Action   Psychosocial Intervention   Interventions No intervention indicated   Currently Taking Psychotropic Meds No   Medication Changes No   Psychosocial Education   Education Signs/Symptoms of depression;Relaxation techniques; Impact self care behaviors on health; Environmental triggers; Coping techniques;Cardiac meds; Benefits of CPR completion; Advanced directives   Psychosocial Target Goals   Target Goal(s) Assess presence or absence of depression using a valid screening tool   Tobacco   Tobacco Use No   Smokeless Tobacco Use No   Nutrition   Stages of Change Action   Diabetes Yes   BG at Home Yes   BG Frequency 2-3   Diabetes Med(s) Metformin   Diabetes Med(s) Change No   BG in Range?  Yes  (124)   Lipids   Date Lipids Drawn   (No recent panels)   Weight Management   Weight  98 kg (216 lb)   Height  5' 11\" (1.803 m)   BMI 30.19   Waist Circumference  38\"   Alcohol None   Nutrition Assessment Tool Rate Your Plate   Nutrition Education   Education DM & CAD relationship;Carb-controlled diet; Low sodium diet; Healthy eating;Special diet   Nutrition Target Goals   Target Goals BMI less than 25; Waist size less than 40 inches males and less than 35 inches for females;Weight loss of 5-10%   Education   Learning Barrier Ready to learn   Education Intervention   Education Schedule Given Yes   Patient Education    Education CAD;Risk factors;Med Compliance;Cardiac A&P;Signs/Symptoms of Angina   Hypertension No   Is BP WDL? Yes   Med(s) Change No   BP Meds No   ACE/ARB Prescribed No   ASA Prescribed Yes   BB Prescribed No   Statins Prescribed Yes   ASA Adherent Yes   Statins Adherent Yes   Statin Intensity Moderate   Education Target Goals   Target Goals Medication compliance; Risk factors; Understand target guidelines for lipids; Understand target guidelines for B/P   Treatment Goals   Goals Exercise   Exercise Goal Increase speed and endurance   Exercise Goal Status Progressing         Exercise Log  12/16/22 0913   Rehab Common Questions   Any problems changes since your last visit Denies   Any symptoms while exercising denies   Patient Reported Glucose 124   Resting EKG rhythm SR   Tobacco Use None   ITP Next Review Date 01/13/23   Visit Number/Total Visits 11/36   On Call Medical Director Immediately Available [facility]   Exercise Treatment Log   Target Heart Rate(Range) 85-99   Resting HR 89   Resting /63   Recovery HR 96   Recovery /67   Weight 98 kg (216 lb)   Exercise EKG Rhythm SR/ST   Exercise Duration 45   Peak    Peak RPE 14   Peak Mets 2.22   SOB 0   Angina 0   Claudulation 0   Asymptomatic yes   Total Minutes Ritesh Hermosillo RN 12/16/2022 3:06 PM

## 2022-12-16 NOTE — PROGRESS NOTES
12/16/22 0913   Treatment Diagnosis   Treatment Diagnosis 1   (CAD)   Referral Date 11/04/22   Significant Cardiovascular History Previous CABG   Co-morbidities Diabetes   OXYGEN INTERVENTION   Oxygen Use No   Individual Treatment Plan   ITP Visit Type Re-Assessment   1st Date of Exercise  11/16/22   ITP Next Review Date 01/13/23   Visit #/Total Visits 11/36   EF %   (No EF available)   Risk Stratification High   ITP Exercise;Psychosocial;Tobacco;Nutrition;Education   Exercise    Stages of Change Action   Assisted Devices None   Data Measured during Walk   Peak RPE 14   Peak RPD 0   Exercise Prescription   Mode Treadmill;Bike;Stepper; Rower; Other (comment); Ergometer   Frequency per week 2-3   Duration per session 31-55   Intensity  METS       2.22 - 4.2   RPE 11-13   Progression Initial   Symptoms with Exercise Other (comment)  (HTN at peak of exercise)   Target Heart Rate 85-99   Resistance Training Yes   Exercise Blood Pressures   Resting /63   Peak /67   Is BP WDL? Yes   Exercise Activity at Home   Type none   Exercise Education   Education Physically active; Warm up/Cool down;Equipment orientation;RPE scale;Signs/Symptoms to report; Exercise safety;Self pulse   Exercise Target Goal   Target Goal(s) Individual exercise RX;BP < 140/90 or < 130/80, if DM or CKD; Aerobic activity 30 + minutes/day  5 days/week   Psychosocial   Stages of Change Action   Psychosocial Intervention   Interventions No intervention indicated   Currently Taking Psychotropic Meds No   Medication Changes No   Psychosocial Education   Education Signs/Symptoms of depression;Relaxation techniques; Impact self care behaviors on health; Environmental triggers; Coping techniques;Cardiac meds; Benefits of CPR completion; Advanced directives   Psychosocial Target Goals   Target Goal(s) Assess presence or absence of depression using a valid screening tool   Tobacco   Tobacco Use No   Smokeless Tobacco Use No   Nutrition   Stages of Change Action Diabetes Yes   BG at Home Yes   BG Frequency 2-3   Diabetes Med(s) Metformin   Diabetes Med(s) Change No   BG in Range? Yes  (124)   Lipids   Date Lipids Drawn   (No recent panels)   Weight Management   Weight  98 kg (216 lb)   Height  5' 11\" (1.803 m)   BMI 30.19   Waist Circumference  38\"   Alcohol None   Nutrition Assessment Tool Rate Your Plate   Nutrition Education   Education DM & CAD relationship;Carb-controlled diet; Low sodium diet; Healthy eating;Special diet   Nutrition Target Goals   Target Goals BMI less than 25; Waist size less than 40 inches males and less than 35 inches for females;Weight loss of 5-10%   Education   Learning Barrier Ready to learn   Education Intervention   Education Schedule Given Yes   Patient Education    Education CAD;Risk factors;Med Compliance;Cardiac A&P;Signs/Symptoms of Angina   Hypertension No   Is BP WDL? Yes   Med(s) Change No   BP Meds No   ACE/ARB Prescribed No   ASA Prescribed Yes   BB Prescribed No   Statins Prescribed Yes   ASA Adherent Yes   Statins Adherent Yes   Statin Intensity Moderate   Education Target Goals   Target Goals Medication compliance; Risk factors; Understand target guidelines for lipids; Understand target guidelines for B/P   Treatment Goals   Goals Exercise   Exercise Goal Increase speed and endurance   Exercise Goal Status Progressing

## 2022-12-19 ENCOUNTER — APPOINTMENT (OUTPATIENT)
Dept: CARDIAC REHAB | Age: 77
End: 2022-12-19
Payer: MEDICARE

## 2022-12-21 ENCOUNTER — HOSPITAL ENCOUNTER (OUTPATIENT)
Dept: CARDIAC REHAB | Age: 77
Discharge: HOME | End: 2022-12-21
Payer: MEDICARE

## 2022-12-21 VITALS — BODY MASS INDEX: 29.99 KG/M2 | WEIGHT: 215 LBS

## 2022-12-21 PROCEDURE — 93798 PHYS/QHP OP CAR RHAB W/ECG: CPT

## 2022-12-23 ENCOUNTER — HOSPITAL ENCOUNTER (OUTPATIENT)
Dept: CARDIAC REHAB | Age: 77
Discharge: HOME | End: 2022-12-23
Payer: MEDICARE

## 2022-12-23 VITALS — BODY MASS INDEX: 29.99 KG/M2 | WEIGHT: 215 LBS

## 2022-12-23 PROCEDURE — 93798 PHYS/QHP OP CAR RHAB W/ECG: CPT

## 2022-12-26 ENCOUNTER — APPOINTMENT (OUTPATIENT)
Dept: CARDIAC REHAB | Age: 77
End: 2022-12-26
Payer: MEDICARE

## 2022-12-28 ENCOUNTER — HOSPITAL ENCOUNTER (OUTPATIENT)
Dept: CARDIAC REHAB | Age: 77
Discharge: HOME | End: 2022-12-28
Payer: MEDICARE

## 2022-12-28 VITALS — WEIGHT: 216 LBS | BODY MASS INDEX: 30.13 KG/M2

## 2022-12-28 PROCEDURE — 93798 PHYS/QHP OP CAR RHAB W/ECG: CPT

## 2022-12-30 ENCOUNTER — HOSPITAL ENCOUNTER (OUTPATIENT)
Dept: CARDIAC REHAB | Age: 77
End: 2022-12-30
Payer: MEDICARE

## 2022-12-30 VITALS — WEIGHT: 216 LBS | BODY MASS INDEX: 30.13 KG/M2

## 2022-12-30 PROCEDURE — 93798 PHYS/QHP OP CAR RHAB W/ECG: CPT

## 2023-01-04 ENCOUNTER — HOSPITAL ENCOUNTER (OUTPATIENT)
Dept: CARDIAC REHAB | Age: 78
Discharge: HOME | End: 2023-01-04
Payer: MEDICARE

## 2023-01-04 VITALS — WEIGHT: 216 LBS | BODY MASS INDEX: 30.13 KG/M2

## 2023-01-04 PROCEDURE — 93798 PHYS/QHP OP CAR RHAB W/ECG: CPT

## 2023-01-06 ENCOUNTER — HOSPITAL ENCOUNTER (OUTPATIENT)
Dept: CARDIAC REHAB | Age: 78
Discharge: HOME | End: 2023-01-06
Payer: MEDICARE

## 2023-01-06 VITALS — BODY MASS INDEX: 30.27 KG/M2 | WEIGHT: 217 LBS

## 2023-01-06 PROCEDURE — 93798 PHYS/QHP OP CAR RHAB W/ECG: CPT

## 2023-01-09 ENCOUNTER — HOSPITAL ENCOUNTER (OUTPATIENT)
Dept: CARDIAC REHAB | Age: 78
Discharge: HOME | End: 2023-01-09
Payer: MEDICARE

## 2023-01-09 VITALS — BODY MASS INDEX: 30.13 KG/M2 | WEIGHT: 216 LBS

## 2023-01-09 PROCEDURE — 93798 PHYS/QHP OP CAR RHAB W/ECG: CPT

## 2023-01-11 ENCOUNTER — HOSPITAL ENCOUNTER (OUTPATIENT)
Dept: CARDIAC REHAB | Age: 78
Discharge: HOME | End: 2023-01-11
Payer: MEDICARE

## 2023-01-11 VITALS — WEIGHT: 217 LBS | BODY MASS INDEX: 30.27 KG/M2

## 2023-01-11 PROCEDURE — 93798 PHYS/QHP OP CAR RHAB W/ECG: CPT

## 2023-01-13 ENCOUNTER — HOSPITAL ENCOUNTER (OUTPATIENT)
Dept: CARDIAC REHAB | Age: 78
Discharge: HOME | End: 2023-01-13
Payer: MEDICARE

## 2023-01-13 VITALS — BODY MASS INDEX: 30.27 KG/M2 | WEIGHT: 217 LBS

## 2023-01-13 PROCEDURE — 93798 PHYS/QHP OP CAR RHAB W/ECG: CPT

## 2023-01-13 NOTE — PROGRESS NOTES
CARDIAC REHAB ITP REASSESSMENT FOR REVIEW AND SIGNATURE  Patient name: Maria Elena Abdi : 1945     Visits from Start of Care:       Reporting Period: 2022 to 2023    Subjective Reports: Pt has verbalized joining a local gym to continue exercising. Progressing well, no complaints at this time. Goals Comments   1. Weight loss of 3-5 lbs by next recert period   [] met                  [] not met  [x] progressing Pt weight at start of program was 209 lbs,pt current weight is 217 lbs. Pt is encouraged to make dietary changes to help manage weight. Diet with whole foods, fruits, vegetables, lean proteins and reduced sodium intake. 2. Add weights and resistance training by next recert period     [] met                  [] not met  [x] progressing Pt will begin to add weights and resistance training to their exercise sessions per tolerance to continue to strengthen cardiac muscle. 3. Continue to improve blood pressure (BP) control by next recert period     [] met                  [] not met  [x] progressing Pt HTN is improving. Pt to be educated on having a BP cuff at home to help monitor for increases. Pt educated on importance of exercise, medication timing and sodium intake to improve outcomes. 4. Increase speed and resistance on exercise machines while maintaining THR by next recert period   [] met                  [] not met  [x] progressing Pt THR is 85-99. Encourage pt to increase the speed and resistance on equipment while keeping within THR to maximize cardiac benefits. Key functional changes: Pt has verbalized incorporating exercise into his routine, pt is encouraged to have daily exercise to improve cardiac health and function. Problems/ barriers to goal attainment: None noted. Assessment / Recommendations: Continue w/ rehab three times a week.       Cardiac ITP  23 1605   Treatment Diagnosis   Treatment Diagnosis 1   (CAD)   Referral Date 22 Significant Cardiovascular History Previous CABG   Co-morbidities Diabetes   OXYGEN INTERVENTION   Oxygen Use No   Individual Treatment Plan   ITP Visit Type Re-Assessment   1st Date of Exercise  11/16/22   ITP Next Review Date 02/10/23   Visit #/Total Visits 20/36   EF %   (No EF available)   Risk Stratification High   ITP Exercise;Psychosocial;Tobacco;Nutrition;Education   Exercise    Stages of Change Action   Assisted Devices None   Data Measured during Walk   Peak RPD 0   Exercise Prescription   Mode Treadmill;Bike;Stepper; Rower; Other (comment); Ergometer   Frequency per week 2-3   Duration per session 31-55   Intensity  METS       2.22 - 4.2   RPE 11-13   Progression Progressing   Symptoms with Exercise Other (comment)  (HTN at peak of exercise)   Target Heart Rate 85-99   Resistance Training Yes   Exercise Blood Pressures   Resting /46   Peak /41   Is BP WDL? No  (Low diastolic)   Exercise Activity at Home   Type none   Exercise Education   Education Physically active; Warm up/Cool down;Equipment orientation;RPE scale;Signs/Symptoms to report; Exercise safety;Self pulse   Exercise Target Goal   Target Goal(s) Individual exercise RX;BP < 140/90 or < 130/80, if DM or CKD; Aerobic activity 30 + minutes/day  5 days/week   Psychosocial   Stages of Change Action   Psychosocial Intervention   Interventions No intervention indicated   Currently Taking Psychotropic Meds No   Medication Changes No   Psychosocial Education   Education Signs/Symptoms of depression;Relaxation techniques; Impact self care behaviors on health; Environmental triggers; Coping techniques;Cardiac meds; Benefits of CPR completion; Advanced directives   Psychosocial Target Goals   Target Goal(s) Assess presence or absence of depression using a valid screening tool   Tobacco   Tobacco Use No   Smokeless Tobacco Use No   Nutrition   Stages of Change Action   Diabetes Yes   BG at Home Yes   BG Frequency 2-3   Diabetes Med(s) Metformin   Diabetes Med(s) Change No   BG in Range? Yes  (137)   Lipids   Date Lipids Drawn   (No recent panels)   Weight Management   Weight  98.4 kg (217 lb)   Height  5' 11\" (1.803 m)   BMI 30.33   Alcohol None   Nutrition Assessment Tool Rate Your Plate   Nutrition Education   Education DM & CAD relationship;Signs/Symptoms Hypo/Hyperglycemia; Carb-controlled diet; Low sodium diet; Healthy eating;Special diet   Nutrition Target Goals   Target Goals BMI less than 25; Waist size less than 40 inches males and less than 35 inches for females;Weight loss of 5-10%   Education   Learning Barrier Ready to learn   Education Intervention   Education Schedule Given Yes   Patient Education    Education CAD;Risk factors;Med Compliance;Cardiac A&P;Signs/Symptoms of Angina   Hypertension No   Hypertension Controlled No   Is BP WDL? Yes   Med(s) Change No   BP Meds No   ACE/ARB Prescribed No   ASA Prescribed Yes   BB Prescribed No   Statins Prescribed Yes   ASA Adherent Yes   Statins Adherent Yes   Statin Intensity Moderate   Education Target Goals   Target Goals Medication compliance; Risk factors; Understand target guidelines for lipids; Understand target guidelines for B/P   Treatment Goals   Goals Exercise   Exercise Goal Increase speed and endurance   Exercise Goal Status Progressing       Exercise Log  01/13/23 0920   Rehab Common Questions   Any problems changes since your last visit Denies   Any symptoms while exercising denies   Psychosocial/Stress Level 0   Patient Reported Glucose 137   Resting EKG rhythm SR w/ occ. PVCs   Tobacco Use None   ITP Next Review Date 02/10/2023   Visit Number/Total Visits 20/36   On Call Medical Director Immediately Available Shanel   Exercise Treatment Log   Target Heart Rate(Range) 85-99   Resting HR 94   Resting /46   Recovery HR 89   Recovery /41   Weight 98.4 kg (217 lb)   Exercise EKG Rhythm ST w/ occ.  PVC   Exercise Duration 45   Peak    Peak RPE 12   Peak Mets 2.22   SOB 0 Angina 0   Claudulation 0   Asymptomatic yes   Total Minutes [facility], [address] [place] [address] 1/13/2023 3:50 PM

## 2023-01-16 ENCOUNTER — HOSPITAL ENCOUNTER (OUTPATIENT)
Dept: CARDIAC REHAB | Age: 78
Discharge: HOME | End: 2023-01-16
Payer: MEDICARE

## 2023-01-16 VITALS — BODY MASS INDEX: 30.27 KG/M2 | WEIGHT: 217 LBS

## 2023-01-16 PROCEDURE — 93798 PHYS/QHP OP CAR RHAB W/ECG: CPT

## 2023-01-18 ENCOUNTER — HOSPITAL ENCOUNTER (OUTPATIENT)
Dept: CARDIAC REHAB | Age: 78
Discharge: HOME | End: 2023-01-18
Payer: MEDICARE

## 2023-01-18 VITALS — WEIGHT: 217 LBS | BODY MASS INDEX: 30.27 KG/M2

## 2023-01-18 PROCEDURE — 93798 PHYS/QHP OP CAR RHAB W/ECG: CPT

## 2023-01-20 ENCOUNTER — HOSPITAL ENCOUNTER (OUTPATIENT)
Dept: CARDIAC REHAB | Age: 78
Discharge: HOME | End: 2023-01-20
Payer: MEDICARE

## 2023-01-20 VITALS — WEIGHT: 217 LBS | BODY MASS INDEX: 30.27 KG/M2

## 2023-01-20 PROCEDURE — 93798 PHYS/QHP OP CAR RHAB W/ECG: CPT

## 2023-01-23 ENCOUNTER — HOSPITAL ENCOUNTER (OUTPATIENT)
Dept: CARDIAC REHAB | Age: 78
Discharge: HOME | End: 2023-01-23
Payer: MEDICARE

## 2023-01-23 VITALS — BODY MASS INDEX: 30.4 KG/M2 | WEIGHT: 218 LBS

## 2023-01-23 PROCEDURE — 93798 PHYS/QHP OP CAR RHAB W/ECG: CPT

## 2023-01-25 ENCOUNTER — HOSPITAL ENCOUNTER (OUTPATIENT)
Dept: CARDIAC REHAB | Age: 78
Discharge: HOME | End: 2023-01-25
Payer: MEDICARE

## 2023-01-25 VITALS — BODY MASS INDEX: 30.4 KG/M2 | WEIGHT: 218 LBS

## 2023-01-25 PROCEDURE — 93798 PHYS/QHP OP CAR RHAB W/ECG: CPT

## 2023-01-27 ENCOUNTER — HOSPITAL ENCOUNTER (OUTPATIENT)
Dept: CARDIAC REHAB | Age: 78
Discharge: HOME | End: 2023-01-27
Payer: MEDICARE

## 2023-01-27 VITALS — WEIGHT: 218 LBS | BODY MASS INDEX: 30.4 KG/M2

## 2023-01-27 PROCEDURE — 93798 PHYS/QHP OP CAR RHAB W/ECG: CPT

## 2023-01-30 ENCOUNTER — HOSPITAL ENCOUNTER (OUTPATIENT)
Dept: CARDIAC REHAB | Age: 78
Discharge: HOME | End: 2023-01-30
Payer: MEDICARE

## 2023-01-30 VITALS — WEIGHT: 219 LBS | BODY MASS INDEX: 30.54 KG/M2

## 2023-01-30 PROCEDURE — 93798 PHYS/QHP OP CAR RHAB W/ECG: CPT

## 2023-02-01 ENCOUNTER — HOSPITAL ENCOUNTER (OUTPATIENT)
Dept: CARDIAC REHAB | Age: 78
Discharge: HOME | End: 2023-02-01
Payer: MEDICARE

## 2023-02-01 VITALS — BODY MASS INDEX: 30.54 KG/M2 | WEIGHT: 219 LBS

## 2023-02-01 PROCEDURE — 93798 PHYS/QHP OP CAR RHAB W/ECG: CPT

## 2023-02-03 ENCOUNTER — HOSPITAL ENCOUNTER (OUTPATIENT)
Dept: CARDIAC REHAB | Age: 78
Discharge: HOME | End: 2023-02-03
Payer: MEDICARE

## 2023-02-03 VITALS — WEIGHT: 219 LBS | BODY MASS INDEX: 30.54 KG/M2

## 2023-02-03 PROCEDURE — 93798 PHYS/QHP OP CAR RHAB W/ECG: CPT

## 2023-02-06 ENCOUNTER — HOSPITAL ENCOUNTER (OUTPATIENT)
Dept: CARDIAC REHAB | Age: 78
Discharge: HOME | End: 2023-02-06
Payer: MEDICARE

## 2023-02-06 VITALS — WEIGHT: 221 LBS | BODY MASS INDEX: 30.82 KG/M2

## 2023-02-06 PROCEDURE — 93798 PHYS/QHP OP CAR RHAB W/ECG: CPT

## 2023-02-08 ENCOUNTER — HOSPITAL ENCOUNTER (OUTPATIENT)
Dept: CARDIAC REHAB | Age: 78
Discharge: HOME | End: 2023-02-08
Payer: MEDICARE

## 2023-02-08 VITALS — WEIGHT: 221 LBS | BODY MASS INDEX: 30.82 KG/M2

## 2023-02-08 PROCEDURE — 93798 PHYS/QHP OP CAR RHAB W/ECG: CPT

## 2023-02-10 ENCOUNTER — HOSPITAL ENCOUNTER (OUTPATIENT)
Dept: CARDIAC REHAB | Age: 78
Discharge: HOME | End: 2023-02-10
Payer: MEDICARE

## 2023-02-10 ENCOUNTER — HOSPITAL ENCOUNTER (OUTPATIENT)
Facility: HOSPITAL | Age: 78
Setting detail: RECURRING SERIES
Discharge: HOME | End: 2023-02-13
Payer: MEDICARE

## 2023-02-10 VITALS — BODY MASS INDEX: 30.82 KG/M2 | WEIGHT: 221 LBS

## 2023-02-10 PROCEDURE — 93798 PHYS/QHP OP CAR RHAB W/ECG: CPT

## 2023-02-10 NOTE — PROGRESS NOTES
CARDIAC REHAB ITP REASSESSMENT FOR REVIEW AND SIGNATURE  Patient name: Ilana Byers : 1945     Visits from Start of Care:       Reporting Period: 2023  to 02/10/2023    Subjective Reports: Pt verbalized he will be placed on a cardiac monitor for 2 weeks, and is waiting to receive the device in the mail. Goals Comments   1. Daily blood pressure (BP) at home by end of program    [] met                  [] not met  [x] progressing Pt is encouraged to monitor BP at home daily after first void in the morning to ensure BP is not becoming hypertensive and medications are effective. Follow up with care provider if BP is not remaining under 140/90    2. Incorporate exercise daily by end of program     [] met                  [] not met  [x] progressing Pt to add daily exercise to his day, such as walking, light weights and resistance bands per tolerance. 3. Weekly weights at home by end of program     [] met                  [] not met  [x] progressing Pt is encouraged to have a scale at home and to monitor weight weekly. Eat a diet primarily of whole foods and monitor sodium intake. 4. Improved blood pressure (BP) control by next recert period     [] met                  [] not met  [x] progressing Pt BP is HTN. Consistent exercise, medication timing, sodium reduction and BP monitoring can assist in managing. Key functional changes: Pt has been having an increased number of PVCs during exercise. Cardiac RN has sent fax to pt's cardiologist with this information and telemetry. Problems/ barriers to goal attainment: None noted. Assessment / Recommendations: Continue w/ rehab three times a week.        Cardiac ITP 02/10/23 0902   Treatment Diagnosis   Treatment Diagnosis 1   (CAD)   Referral Date 22   Significant Cardiovascular History Previous CABG   Co-morbidities Diabetes   OXYGEN INTERVENTION   Oxygen Use No   Individual Treatment Plan   ITP Visit Type Re-Assessment 1st Date of Exercise  11/16/22   ITP Next Review Date 03/10/23   Visit #/Total Visits 32/36   EF %   (No EF available)   Risk Stratification High   ITP Exercise;Psychosocial;Tobacco;Nutrition;Education   Exercise    Stages of Change Action   Assisted Devices None   Data Measured Before Walk   O2 Device Room air   Data Measured during Walk   Any problems while exercising   (RT Hip pain)   Peak RPE 14   Peak RPD 0   Exercise Prescription   Mode Treadmill;Bike;Stepper; Rower; Other (comment); Ergometer   Frequency per week 2-3   Duration per session 31-55   Intensity  METS       2.22 - 4.2   RPE 11-13   Progression Progressing   Symptoms with Exercise Other (comment)  (HTN at peak of exercise)   Target Heart Rate 85-99   Resistance Training Yes   Exercise Blood Pressures   Resting /65   Peak /69   Is BP WDL? Yes   Exercise Activity at Home   Type none   Exercise Education   Education Physically active; Warm up/Cool down;Equipment orientation;RPE scale;Signs/Symptoms to report; Exercise safety;Self pulse   Exercise Target Goal   Target Goal(s) Individual exercise RX;BP < 140/90 or < 130/80, if DM or CKD; Aerobic activity 30 + minutes/day  5 days/week   Psychosocial   Stages of Change Action   Psychosocial Intervention   Interventions No intervention indicated   Currently Taking Psychotropic Meds No   Medication Changes No   Psychosocial Education   Education Signs/Symptoms of depression;Relaxation techniques; Impact self care behaviors on health; Environmental triggers; Coping techniques;Cardiac meds; Benefits of CPR completion; Advanced directives   Psychosocial Target Goals   Target Goal(s) Assess presence or absence of depression using a valid screening tool   Tobacco   Tobacco Use No   Smokeless Tobacco Use No   Nutrition   Stages of Change Action   Diabetes Yes   BG at Home Yes   BG Frequency 2-3   Diabetes Med(s) Metformin   Diabetes Med(s) Change No   BG in Range?  Yes  (137)   Lipids   Date Lipids Drawn   (No recent panels)   Weight Management   Weight  98.4 kg (217 lb)   Height  5' 11\" (1.803 m)   BMI 30.33   Alcohol None   Nutrition Assessment Tool Rate Your Plate   Nutrition Education   Education Signs/Symptoms Hypo/Hyperglycemia;DM & CAD relationship;Carb-controlled diet; Low sodium diet; Healthy eating;Special diet   Nutrition Target Goals   Target Goals BMI less than 25; Waist size less than 40 inches males and less than 35 inches for females;Weight loss of 5-10%   Education   Learning Barrier Ready to learn   Education Intervention   Education Schedule Given Yes   Patient Education    Education CAD;Risk factors;Med Compliance;Cardiac A&P;Signs/Symptoms of Angina   Hypertension No   Hypertension Controlled No   Is BP WDL? Yes   Med(s) Change No   BP Meds No   ACE/ARB Prescribed No   ASA Prescribed Yes   BB Prescribed No   Statins Prescribed Yes   ASA Adherent Yes   Statins Adherent Yes   Statin Intensity Moderate   Education Target Goals   Target Goals Medication compliance; Risk factors; Understand target guidelines for lipids; Understand target guidelines for B/P   Treatment Goals   Goals Exercise   Exercise Goal Increase speed and endurance   Exercise Goal Status Progressing       Exercise Log  02/10/23 0902   Rehab Common Questions   Any problems changes since your last visit Denies   Any symptoms while exercising denies   Psychosocial/Stress Level 0   Patient Reported Glucose 109   Resting EKG rhythm SR   Tobacco Use None   ITP Next Review Date 02/10/23   Visit Number/Total Visits 32/36   On Call Medical Director Immediately Available Dianne/Alexandr   Exercise Treatment Log   Target Heart Rate(Range) 85-99   Resting HR 78   Resting /65   Recovery HR 74   Recovery /69   Weight 100.2 kg (221 lb)   Exercise EKG Rhythm SR w/ occ.  PVCs/Bigeminy   Exercise Duration 45   Peak HR 87   Peak RPE 14   Peak Mets 3.7   SOB 0   Angina 0   Claudulation 0   Asymptomatic Yes   Total Minutes [facility], RN 2/10/2023 2:32 PM

## 2023-02-13 ENCOUNTER — APPOINTMENT (OUTPATIENT)
Facility: HOSPITAL | Age: 78
End: 2023-02-13
Payer: MEDICARE

## 2023-02-15 ENCOUNTER — APPOINTMENT (OUTPATIENT)
Facility: HOSPITAL | Age: 78
End: 2023-02-15
Payer: MEDICARE

## 2023-02-17 ENCOUNTER — HOSPITAL ENCOUNTER (OUTPATIENT)
Facility: HOSPITAL | Age: 78
Setting detail: RECURRING SERIES
Discharge: HOME | End: 2023-02-20
Payer: MEDICARE

## 2023-02-17 VITALS — WEIGHT: 223 LBS

## 2023-02-17 PROCEDURE — 93798 PHYS/QHP OP CAR RHAB W/ECG: CPT

## 2023-02-17 ASSESSMENT — EXERCISE STRESS TEST
PEAK_METS: 3.7
PEAK_RPE: 14
PEAK_HR: 87

## 2023-02-20 ENCOUNTER — APPOINTMENT (OUTPATIENT)
Facility: HOSPITAL | Age: 78
End: 2023-02-20
Payer: MEDICARE

## 2023-02-22 ENCOUNTER — HOSPITAL ENCOUNTER (OUTPATIENT)
Facility: HOSPITAL | Age: 78
Setting detail: RECURRING SERIES
Discharge: HOME | End: 2023-02-25
Payer: MEDICARE

## 2023-02-22 VITALS — WEIGHT: 224 LBS

## 2023-02-22 PROCEDURE — 93798 PHYS/QHP OP CAR RHAB W/ECG: CPT

## 2023-02-22 ASSESSMENT — EXERCISE STRESS TEST
PEAK_METS: 3.7
PEAK_RPE: 14
PEAK_HR: 85

## 2023-02-24 ENCOUNTER — HOSPITAL ENCOUNTER (OUTPATIENT)
Facility: HOSPITAL | Age: 78
Setting detail: RECURRING SERIES
Discharge: HOME | End: 2023-02-27
Payer: MEDICARE

## 2023-02-24 VITALS — WEIGHT: 224 LBS

## 2023-02-24 PROCEDURE — 93798 PHYS/QHP OP CAR RHAB W/ECG: CPT

## 2023-02-24 ASSESSMENT — EXERCISE STRESS TEST
PEAK_HR: 83
PEAK_METS: 3.7
PEAK_RPE: 14

## 2023-02-27 ENCOUNTER — HOSPITAL ENCOUNTER (OUTPATIENT)
Facility: HOSPITAL | Age: 78
Setting detail: RECURRING SERIES
Discharge: HOME | End: 2023-03-02
Payer: MEDICARE

## 2023-02-27 VITALS — WEIGHT: 224 LBS

## 2023-02-27 PROCEDURE — 93798 PHYS/QHP OP CAR RHAB W/ECG: CPT

## 2023-02-27 ASSESSMENT — EXERCISE STRESS TEST
PEAK_METS: 1.99
PEAK_RPD: 0
PEAK_RPE: 13
PEAK_HR: 75
PEAK_BP: 140/80
PEAK_BP: 140/80
PEAK_HR: 86
PEAK_BP: 140/80
PEAK_RPE: 13

## 2023-02-27 ASSESSMENT — PATIENT HEALTH QUESTIONNAIRE - PHQ9
SUM OF ALL RESPONSES TO PHQ9 QUESTIONS 1 & 2: 0
3. TROUBLE FALLING OR STAYING ASLEEP: 0
5. POOR APPETITE OR OVEREATING: 0
2. FEELING DOWN, DEPRESSED OR HOPELESS: 0
1. LITTLE INTEREST OR PLEASURE IN DOING THINGS: 0
9. THOUGHTS THAT YOU WOULD BE BETTER OFF DEAD, OR OF HURTING YOURSELF: 0
8. MOVING OR SPEAKING SO SLOWLY THAT OTHER PEOPLE COULD HAVE NOTICED. OR THE OPPOSITE, BEING SO FIGETY OR RESTLESS THAT YOU HAVE BEEN MOVING AROUND A LOT MORE THAN USUAL: 0
SUM OF ALL RESPONSES TO PHQ QUESTIONS 1-9: 0
7. TROUBLE CONCENTRATING ON THINGS, SUCH AS READING THE NEWSPAPER OR WATCHING TELEVISION: 0
SUM OF ALL RESPONSES TO PHQ QUESTIONS 1-9: 0
10. IF YOU CHECKED OFF ANY PROBLEMS, HOW DIFFICULT HAVE THESE PROBLEMS MADE IT FOR YOU TO DO YOUR WORK, TAKE CARE OF THINGS AT HOME, OR GET ALONG WITH OTHER PEOPLE: 0
4. FEELING TIRED OR HAVING LITTLE ENERGY: 0
SUM OF ALL RESPONSES TO PHQ QUESTIONS 1-9: 0
SUM OF ALL RESPONSES TO PHQ QUESTIONS 1-9: 0
6. FEELING BAD ABOUT YOURSELF - OR THAT YOU ARE A FAILURE OR HAVE LET YOURSELF OR YOUR FAMILY DOWN: 0

## 2023-02-27 ASSESSMENT — LIFESTYLE VARIABLES: SMOKELESS_TOBACCO: NO

## 2023-02-28 NOTE — PROGRESS NOTES
CARDIAC REHAB DISCHARGE NOTE FOR REVIEW AND SIGNATURE      Patient: Saint Deem (65 y.o. male)  Date: 2/28/2023  Primary Diagnosis: No admission diagnoses are documented for this encounter. Mr. Maral Britt has completed Phase II of Cardiac Rehab; having attended 36 sessions from 11/16/22 -02/27/2023. Patient is interested in maintaining optimal health and will work with his referring physician Dr. Diane Sargent. He has improved endurance and stamina through regular exercise during the program. Weight loss since SOC:0 lbs (pt gained 20 lbs) and 0 inches from waist. Blood pressure at discharge is  WNL. Patient's [place] level has improved. PHQ-9 Depression Inventory score has improved. Pt's MET level has not increased during 6-Minute Walk Test. These scores/results have been reviewed with patient. Patient is progressing and has met his re-certification goals. Patient plans to continue exercising upon discharge (at home, gym, etc). Patient education completed in collaboration with RN/EP for revised goals upon discharge, to include: recommended cardio equipment; light weights; and walking 3-5 x week. Discharge Recommendations:   Consult with dr jimenez: follow through post discharge. Continue exercise, join a local gym program such as [employer],eat a heart healthy diet, monitor BP, weight  and stay compliant with medications.      Cardiac ITP     Treatment Diagnosis  Treatment Diagnosis 1:  (CAD)  Referral Date: 01/04/22  Significant Cardiovascular History: Previous CABG  Co-morbidities: Diabetes           Oxygen Intervention  Oxygen Use: No  O2 Sat Greater Than 90%: Yes     Individual Treatment Plan  ITP Visit Type: Discharge, completed program  1st Date of Exercise : 11/16/22  Visit #/Total Visits: 36/36  EF%:  (No EF available)  Risk Stratification: High  ITP: Exercise, Psychosocial, Tobacco, Nutrition, Education   Exercise   Stages of Change: Maintenance  DASI Total Score: 33.95  Test: Six minute walk test    Data Measured Before Walk  Heart Rate: 79  Blood Pressure: 150/64  O2 Saturation: 98  O2 Device: Room air  RPD: 0  RPE: 0    Data Measured during Walk  Indicate Mode of RPE: 6 minutes  RPE Data Measured: During  Treadmill Speed: 2 mph  Treadmill Grade: 0 %  Any problems while exercising: none  Do You Have Shortness of Breath: No  Describe Type of Pain You Are Having: none  Peak RPE: 13  Peak RPD: 0    Data Measured Immediately After Walk  Distance Walked in : Feet   Distance Walked (miles): 0.13  Distance Walked in Feet (Calculated): 686.4 ft  Peak Heart Rate: 75  Peak Blood Pressure: 140/80  RPE: 0  O2 Saturation: 96    Data Measured at 5 Minutes After Walk  Heart Rate: 74  Blood Pressure: 138/68  SpO2: 96 %  O2 Device: Room air    Exercise Prescription  Mode: Treadmill, Bike, Stepper, Ergometer, Other (comment) (weights)  Frequency per week: 3  Duration Per Session: 31-55  Intensity METS      : 2.22 - 4.22  RPE: 11-13  Progression: Met  Symptoms with Exercise: Other (comment) (on occasion increased hip pain\)  Target Heart Rate: 85-99  Resistance Training: Yes   Exercise Blood Pressures  Resting BP: 150/64  Peak BP: 140/80  Is BP WDL: No    Exercise Activity at Home  Type: local gym    Exercise Education  Education: Self pulse, Exercise safety, Signs/symptoms to report, RPE scale, Equipment orientation, Warm up/cool down, Physically active    Exercise Target Goal  Target Goal(s):  Individual exercise RX, BP < 140/90 or < 130/80, if DM or CKD, Aerobic activity 30 + minutes/day  5 days/week    Psychosocial  Stages of Change: Maintenance  [facility] Total Score: 16  PHQ-9 Total Score: 0    Psychosocial Intervention  Interventions: No intervention indicated (PHQ9: 0)  Currently Taking Psychotropic Meds: No  Medication Changes: No    Psychosocial Education  Education: Advanced directives, Benefits of CPR completion, Cardiac meds, Environmental triggers, Coping techniques, Impact self care behaviors on health, Relaxation techniques, Signs/symptoms of depression    Psychosocial Target Goals  Target Goal(s): Assess presence or absence of depression using a valid screening tool  Uses Stress Mgmt Techniques: Yes    Tobacco  Tobacco Use: No  Smokeless Tobacco Use: No              Nutrition  Stages of Change: Maintenance  Diabetes: Yes  BG at Home: Yes  BG Frequency: 2-3  Diabetes Med(s): Metformin  Diabetes Med(s) Change: No  BG in Range: No (131)                        BG at Home: Yes    Diabetes Med(s): Metformin    Diabetes Med(s) Change: No    BG in Range: No (131)         Lipids  Date Lipids Drawn:  (No recent panels)  Lipid Med(s): Atorvastatin 10mg, Crestor 10mg  Lipid Med Change(s): No    Weight Management  Weight : 224 lb (101.6 kg)  Height : 5' 11\" (180.3 cm)  BMI: 31.31  Waist Circumference : 44\"  Alcohol: None  Nutrition Assessment Tool: Rate Your Plate (score 38)  Rate Your Plate Total Score: 38              Nutrition Target Goals  Target Goals: LDL-C less than 70 and non-HDL-C <100 for those with ASCVD, Triglycerides less than 150, HbA1C less than 7 percent for those with diabetes, Waist less than 40 inches males, 35 for females, Weight loss of 5-10%         Education  Learning Barrier: Hearing, Ready to learn    Education Intervention  Education Schedule Given: Yes    Patient Education   Education: CAD, Risk factors, Med compliance, Cardiac A&P, Signs/Symptoms of angina  Hypertension: No  Hypertension Controlled: No  Is BP WDL: No  Med(s) Change: Yes  BP Meds: Metoprolol    Education Target Goals  Target Goals: Medication compliance, Risk factors, Understand target guidelines for lipids, Understand target guidelines for B/P    Staff Treatment Goals  Goals: Exercise  Exercise Goal: Increase endurance and stamina  Exercise Goal Status: Met             Exercise Log    Rehab Common Questions  Any Problems or Changes Since Your Last Visit : Denies  Any Symptoms While Exercising: Denies  Psychosocial/Stress Level: 0  Patient Reported Glucose: 131  Resting EKG Rhythm: SR  Tobacco Use: None  Enter O2 Saturation and Liter Flow: 98% RA  Visit Number/Total Visits: 36/36  On Call Medical Director Immediately Available: Kathleen Merida   Exercise Treatment Log  Target Heart Rate (Range): 85-99  Resting HR: 75  Resting BP: 150/64  Recovery HR: 72  Recovery BP: 138/60  Weight: 224 lb (101.6 kg)  Exercise EKG Rhythm: SR  Exercise Duration: 36  Peak HR: 86  Peak BP: 140/80  Peak RPE: 13  Peak Mets: 1.99  SOB: 0  Angina: 0  Claudication: 0  Asymptomatic: Yes  O2 Saturation: 96% RA  Total Minutes: 6      Thank you for this referral.  Gisele Arreaga RN 2/28/2023

## 2024-01-01 ENCOUNTER — HOME CARE VISIT (OUTPATIENT)
Age: 79
End: 2024-01-01
Payer: MEDICARE

## 2024-01-01 PROCEDURE — G0299 HHS/HOSPICE OF RN EA 15 MIN: HCPCS

## 2024-01-01 PROCEDURE — G0155 HHCP-SVS OF CSW,EA 15 MIN: HCPCS

## 2024-01-01 PROCEDURE — G0156 HHCP-SVS OF AIDE,EA 15 MIN: HCPCS

## 2024-01-01 ASSESSMENT — ENCOUNTER SYMPTOMS
BOWEL INCONTINENCE: 1
SNORING: 1
SNORING: 1
CONSTIPATION: 1
SNORING: 1
SPUTUM PRODUCTION: 1
DYSPNEA ACTIVITY LEVEL: AT REST
CONSTIPATION: 1

## 2024-05-22 ENCOUNTER — HOME CARE VISIT (OUTPATIENT)
Age: 79
End: 2024-05-22
Payer: MEDICARE

## 2024-05-22 ENCOUNTER — HOSPICE ADMISSION (OUTPATIENT)
Age: 79
End: 2024-05-22
Payer: MEDICARE

## 2024-05-22 VITALS
DIASTOLIC BLOOD PRESSURE: 64 MMHG | HEART RATE: 75 BPM | OXYGEN SATURATION: 98 % | RESPIRATION RATE: 20 BRPM | SYSTOLIC BLOOD PRESSURE: 142 MMHG | TEMPERATURE: 97.7 F

## 2024-05-22 PROCEDURE — 0651 HSPC ROUTINE HOME CARE

## 2024-05-22 PROCEDURE — G0299 HHS/HOSPICE OF RN EA 15 MIN: HCPCS

## 2024-05-22 ASSESSMENT — ENCOUNTER SYMPTOMS
DYSPNEA ACTIVITY LEVEL: AFTER AMBULATING LESS THAN 10 FT
HEMOPTYSIS: 0
HEMOPTYSIS: 0

## 2024-05-23 ENCOUNTER — HOME CARE VISIT (OUTPATIENT)
Age: 79
End: 2024-05-23
Payer: MEDICARE

## 2024-05-23 PROCEDURE — 0651 HSPC ROUTINE HOME CARE

## 2024-05-23 NOTE — HOSPICE
Hospice philosophy, consents discussed and reviewed with patient and patient's daughter, oLan. Both were in agreement with current POC and consent education. Consents signed and full admission to follow.

## 2024-05-23 NOTE — HOSPICE
is present and willing and safely able to provide care and administer medications daily and as needed.  The patient/family/caregiver participated in goal setting, care planning, and are agreeable to the care plan.  Admission booklet reviewed with patient/family/caregiver; services provided under hospice benefit, review of rights and responsibilities, disposal of medications, contact information for , Joint Commission, Medicare, QIO, and outside resources for independence.  The patient/family/caregiver/facility educated on IDT and their right to attend meetings.  Education provided regarding 24-hour availability of hospice services and on-call number provided.      Attending: ellie  Medical Director: Dr Linder  Level of Care: Routine  Advance Directives:  DNR  :  fishfishme  Allergies:  KNDA  LMAC:  30cm  PPS: 50%  FAST: N/A  NYHA:  N/A   EF%:  N/A   Tobacco usage:  former  Functional status:  Independent for ADLs within the home, although gets short of breath quickly with ambulation/exertion but is able to recover with rest in chair and oxygen on within 5-10 minutes  Infection:  N/A   Bowel Regimen:  Bisacodyl 10mg supp prn daily for constipation; patient also has Senna on his list of medications.  Lines, Drains, or Airways present:  N/A  Wounds present: N/A  Symptoms to monitor to maintain comfort: shortness of breath/any new onset anxiety/agitation or pain from disease process.  Hospice Aide Services Requested:  Not at this time  MSW Requested: open to   Requested: open to  Volunteer Services Requested: open to  Bereavement risk/contact:  sera Hi on admission  Patient/family/caregiver specific end of life goal: Comfort care and maintain independence as much as possible throughout hospice.  Training and education provided this visit: Hospice philosophy, medication education, 24-hour number, oxygen education.  Plan for next visit:  Reassess patient and answer any new

## 2024-05-24 ENCOUNTER — HOME CARE VISIT (OUTPATIENT)
Age: 79
End: 2024-05-24
Payer: MEDICARE

## 2024-05-24 PROCEDURE — G0155 HHCP-SVS OF CSW,EA 15 MIN: HCPCS

## 2024-05-24 PROCEDURE — G0299 HHS/HOSPICE OF RN EA 15 MIN: HCPCS

## 2024-05-24 PROCEDURE — 0651 HSPC ROUTINE HOME CARE

## 2024-05-25 PROCEDURE — 0651 HSPC ROUTINE HOME CARE

## 2024-05-26 PROCEDURE — 0651 HSPC ROUTINE HOME CARE

## 2024-05-28 NOTE — HOSPICE
Patient/Caregiver/Family findings/issues during SN visit:  pt in recliner, girlfriend Nelly present, medications are held up at [facility], spoke to Kathleen and pt was still in referal, she moved to admitted and cleared meds for signature    Medication refills ordered this visit: meds released    Medications reconciled and all medications are not available in the home this visit.  The following education was provided regarding medications,  medication interactions, and look alike medications.  Response to teaching: pt and Nelly verbalized understanding. Medications are effective at this time.      Supplies by type and quantity ordered this visit include: none needed    Consulted medical director/attending physician regarding: Dr Linder aware of meds and will sign off once released    Instructed patient/family/caregiver on 24-hour hospice availability and phone number.    Pl an for next visit:  monitor respiratory status

## 2024-05-30 ENCOUNTER — HOME CARE VISIT (OUTPATIENT)
Age: 79
End: 2024-05-30
Payer: MEDICARE

## 2024-05-30 PROCEDURE — G0300 HHS/HOSPICE OF LPN EA 15 MIN: HCPCS

## 2024-05-31 ENCOUNTER — HOME CARE VISIT (OUTPATIENT)
Age: 79
End: 2024-05-31
Payer: MEDICARE

## 2024-05-31 VITALS
DIASTOLIC BLOOD PRESSURE: 62 MMHG | HEART RATE: 78 BPM | RESPIRATION RATE: 18 BRPM | OXYGEN SATURATION: 97 % | SYSTOLIC BLOOD PRESSURE: 124 MMHG

## 2024-05-31 VITALS
DIASTOLIC BLOOD PRESSURE: 64 MMHG | HEART RATE: 79 BPM | OXYGEN SATURATION: 97 % | SYSTOLIC BLOOD PRESSURE: 142 MMHG | TEMPERATURE: 98.1 F | RESPIRATION RATE: 18 BRPM

## 2024-05-31 PROCEDURE — G0299 HHS/HOSPICE OF RN EA 15 MIN: HCPCS

## 2024-05-31 ASSESSMENT — ENCOUNTER SYMPTOMS
COUGH: 1
SPUTUM AMOUNT: MODERATE
SPUTUM CONSISTENCY: THICK
SPUTUM PRODUCTION: 1
DYSPNEA ACTIVITY LEVEL: AFTER AMBULATING LESS THAN 10 FT
SPUTUM COLOR: WHITE
PAIN LOCATION - PAIN QUALITY: SHARP
COUGH CHARACTERISTICS: PRODUCTIVE

## 2024-05-31 NOTE — HOSPICE
Patient/Caregiver/Family/Facility findings/issues during SN visit:  No new concerns this visit.  Caregiver states the patient is doing better the past week or so.  Going on outings and to Buddhism.  Minimal SOB. BM yesterday.  Sleeping through the night. No sleeping during the day.    Medication refills ordered this visit: Lorazepam  Confirmation # [id number]    Medications reconciled and all medications are available in the home this visit.  The following education was provided regarding medications, medication interactions, and look alike medications: Medication side effects, dosages, purposes, frequencies.  Response to teaching: Verbalized understanding. Medications are effective at this time.      Supplies by type and quantity ordered this visit include: Not needed this visit    Consulted medical director/attending physician regarding: Local script for Lorazepam    Instructed patient/family/caregiver on 24-hour hospice availability and phone number.    Plan for next visit:  Continue end of life education and support caregiver.

## 2024-06-03 ASSESSMENT — ENCOUNTER SYMPTOMS: DYSPNEA ACTIVITY LEVEL: AFTER AMBULATING MORE THAN 20 FT

## 2024-06-04 ENCOUNTER — HOME CARE VISIT (OUTPATIENT)
Age: 79
End: 2024-06-04
Payer: MEDICARE

## 2024-06-04 VITALS
DIASTOLIC BLOOD PRESSURE: 64 MMHG | HEART RATE: 76 BPM | TEMPERATURE: 98.2 F | SYSTOLIC BLOOD PRESSURE: 111 MMHG | RESPIRATION RATE: 18 BRPM | OXYGEN SATURATION: 97 %

## 2024-06-04 PROCEDURE — G0299 HHS/HOSPICE OF RN EA 15 MIN: HCPCS

## 2024-06-05 NOTE — HOSPICE
Patient/Caregiver/Family findings/issues during SN visit:  pt in recliner, denies pain, no falls, no needs    Medication refills ordered this visit: none    Medications reconciled and all medications are available in the home this visit.  The following education was provided regarding medications, medication interactions, and look alike medications.  Response to teaching: pt and Nelly verbalized understanding. Medications are effective at this time.      Supplies by type and quantity ordered this visit include: none    Consulted medical director/attending physician regarding: N/A    Instructed patient/family/caregiver on 24-hour hospice availability and phone number.    Plan for next visit:  monitor respiratory status

## 2024-06-05 NOTE — HOSPICE
Patient/Caregiver/Family findings/issues during SN visit:  pt eating lunch at the table, A&Ox4. Denies pain. Nelly present. No falls reported    Medication refills ordered this visit: senna    Medications reconciled and all medications are available in the home this visit.  The following education was provided regarding medications, medication interactions, and look alike medications.  Response to teaching: pt and Nelly verbalized understanding. Medications are  effective at this time.      Supplies by type and quantity ordered this visit include: none needed    Consulted medical director/attending physician regarding: N/A    Instructed patient/family/caregiver on 24-hour hospice availability and phone number.    Plan for next visit:  fall precautions

## 2024-06-07 ENCOUNTER — HOME CARE VISIT (OUTPATIENT)
Age: 79
End: 2024-06-07
Payer: MEDICARE

## 2024-06-07 VITALS
OXYGEN SATURATION: 98 % | DIASTOLIC BLOOD PRESSURE: 77 MMHG | HEART RATE: 101 BPM | SYSTOLIC BLOOD PRESSURE: 139 MMHG | RESPIRATION RATE: 18 BRPM | TEMPERATURE: 98.3 F

## 2024-06-07 PROCEDURE — G0299 HHS/HOSPICE OF RN EA 15 MIN: HCPCS

## 2024-06-11 ENCOUNTER — HOME CARE VISIT (OUTPATIENT)
Age: 79
End: 2024-06-11
Payer: MEDICARE

## 2024-06-11 VITALS
SYSTOLIC BLOOD PRESSURE: 150 MMHG | TEMPERATURE: 98.7 F | HEART RATE: 80 BPM | OXYGEN SATURATION: 98 % | RESPIRATION RATE: 18 BRPM | DIASTOLIC BLOOD PRESSURE: 76 MMHG

## 2024-06-11 PROCEDURE — G0299 HHS/HOSPICE OF RN EA 15 MIN: HCPCS

## 2024-06-11 ASSESSMENT — ENCOUNTER SYMPTOMS: PAIN LOCATION - PAIN QUALITY: ACHE

## 2024-06-11 NOTE — HOSPICE
Patient/Caregiver/Family findings/issues during SN visit:  SOB after taking the trash out with his scooter, reiterated wearing portable O2. Pt verbalized understanding    Medication refills ordered this visit: none    Medications reconciled and all medications are available in the home this visit.  The following education was provided regarding medications, medication interactions, and look alike medications.  Response to teaching: pt verbalized understanding. Medications are effective at this time.      Supplies by type and quantity ordered this visit include: none    Consulted medical director/attending physician regarding: N/A    Instructed patient/family/caregiver on 24-hour hospice availability and phone number.    Plan for next visit:  monitor respiratory status

## 2024-06-12 ASSESSMENT — ENCOUNTER SYMPTOMS
DYSPNEA ACTIVITY LEVEL: AFTER AMBULATING MORE THAN 20 FT
PAIN LOCATION - PAIN QUALITY: SORE

## 2024-06-12 NOTE — HOSPICE
Patient/Caregiver/Family findings/issues during SN visit:  pt stated he had a 30 min period of chest pain and there was a \"bulge\" upper abd, possible hernia, no bulge noted on assessment. will continue to monitor    Medication refills ordered this visit: None    Medications reconciled and all medications are available in the home this visit.  The following education was provided regarding medications, medication interactions, and look alike medications.  Response to teaching: pt verbalized understanding. Medications are effective at this time.      Supplies by type and quantity ordered this visit include: none    Consulted medical director/attending physician regarding: N/A    Instructed patient/family/caregiver on 24-hour hospice availability and phone number.    Plan for next visit:  monitor pain

## 2024-06-14 ENCOUNTER — HOME CARE VISIT (OUTPATIENT)
Age: 79
End: 2024-06-14
Payer: MEDICARE

## 2024-06-14 PROCEDURE — G0299 HHS/HOSPICE OF RN EA 15 MIN: HCPCS

## 2024-06-14 ASSESSMENT — ENCOUNTER SYMPTOMS: PAIN LOCATION - PAIN QUALITY: HARD

## 2024-06-14 NOTE — HOSPICE
Patient/Caregiver/Family findings/issues during SN visit:  increased pain this morning lasting 30 min. Pt requests a long acting pain med    Medication refills ordered this visit: MS Contin 15mg BID, Norco 5-325mg Q4HR PRN    Medications reconciled and all medications are available in the home this visit.  The following education was provided regarding medications, medication interactions, and look alike medications.  Response to teaching: pt and Nelly verbalized understanding. Medications are effective at this time.      Supplies by type and quantity ordered this visit include: none    Consulted medical director/attending physician regarding: Cindy ROBERT rx new pain regime    Instructed patient/family/caregiver on 24-hour hospice availability and phone number.    Plan for next visit:  monitor pain

## 2024-06-18 ENCOUNTER — HOME CARE VISIT (OUTPATIENT)
Age: 79
End: 2024-06-18
Payer: MEDICARE

## 2024-06-18 VITALS
RESPIRATION RATE: 18 BRPM | TEMPERATURE: 98.7 F | DIASTOLIC BLOOD PRESSURE: 78 MMHG | HEART RATE: 79 BPM | OXYGEN SATURATION: 98 % | SYSTOLIC BLOOD PRESSURE: 130 MMHG

## 2024-06-18 PROCEDURE — G0299 HHS/HOSPICE OF RN EA 15 MIN: HCPCS

## 2024-06-21 ASSESSMENT — ENCOUNTER SYMPTOMS: DYSPNEA ACTIVITY LEVEL: AFTER AMBULATING 10 - 20 FT

## 2024-06-21 NOTE — HOSPICE
Patient/Caregiver/Family findings/issues during SN visit:  pt stated he has \"been feeling great since I saw you last\", no pain. Went to [facility], no needs or concerns    Medication refills ordered this visit: none    Medications reconciled and all medications are available in the home this visit.  The following education was provided regarding medications, medication interactions, and look alike medications.  Response to teaching: pt and Nelly verbalized understanding. Medications are effective at this time.      Supplies by type and quantity ordered this visit include: none    Consulted medical director/attending physician regarding: N/A    Instructed patient/family/caregiver on 24-hour hospice availability and phone number.    Plan for next visit:  monitor pain

## 2024-06-24 ENCOUNTER — HOME CARE VISIT (OUTPATIENT)
Age: 79
End: 2024-06-24
Payer: MEDICARE

## 2024-06-24 NOTE — HOSPICE
MSW places call to pt for routine visit. Pt declines wanting a MSW visit at this time. PT states he is doing really well and enjoyed seeing Oz and Coreen in concert recently. Pt agrees for MSW to follow up next month to assess emotional support needs. MSW will continue to follow

## 2024-06-25 ENCOUNTER — HOME CARE VISIT (OUTPATIENT)
Age: 79
End: 2024-06-25
Payer: MEDICARE

## 2024-06-25 VITALS
TEMPERATURE: 98.6 F | OXYGEN SATURATION: 94 % | DIASTOLIC BLOOD PRESSURE: 54 MMHG | SYSTOLIC BLOOD PRESSURE: 127 MMHG | HEART RATE: 82 BPM | RESPIRATION RATE: 18 BRPM

## 2024-06-25 PROCEDURE — G0299 HHS/HOSPICE OF RN EA 15 MIN: HCPCS

## 2024-06-25 NOTE — HOSPICE
Patient was in his recliner having a great day. He was feeling godd and he as well as Nelly shared today.  will continue to follow.

## 2024-06-26 ASSESSMENT — ENCOUNTER SYMPTOMS: PAIN LOCATION - PAIN QUALITY: ACHE

## 2024-06-26 NOTE — HOSPICE
Patient/Caregiver/Family findings/issues during SN visit:  pt has had a few \"episodes\" of pain in his back. Vicodin is effective at this time. Not taking NS Contin at this time.     Medication refills ordered this visit: Senna-s    Medications reconciled and all medications are available in the home this visit.  The following education was provided regarding medications, medication interactions, and look alike medications.  Response to teaching: pt and Nelly verbalized understanding. Medications are effective at this time.      Supplies by type and quantity ordered this visit include: none    Consulted medical director/attending physician regarding: N/A    Instructed patient/family/caregiver on 24-hour hospice availability and phone number.    Plan for next visit:  monitor pain

## 2024-06-28 ENCOUNTER — HOME CARE VISIT (OUTPATIENT)
Age: 79
End: 2024-06-28
Payer: MEDICARE

## 2024-06-28 VITALS
SYSTOLIC BLOOD PRESSURE: 115 MMHG | TEMPERATURE: 98.1 F | HEART RATE: 73 BPM | DIASTOLIC BLOOD PRESSURE: 68 MMHG | RESPIRATION RATE: 18 BRPM | OXYGEN SATURATION: 98 %

## 2024-06-28 PROCEDURE — G0299 HHS/HOSPICE OF RN EA 15 MIN: HCPCS

## 2024-07-02 ASSESSMENT — ENCOUNTER SYMPTOMS: DYSPNEA ACTIVITY LEVEL: AFTER AMBULATING MORE THAN 20 FT

## 2024-07-03 ENCOUNTER — HOME CARE VISIT (OUTPATIENT)
Age: 79
End: 2024-07-03
Payer: MEDICARE

## 2024-07-03 VITALS
DIASTOLIC BLOOD PRESSURE: 71 MMHG | TEMPERATURE: 98.9 F | SYSTOLIC BLOOD PRESSURE: 144 MMHG | HEART RATE: 74 BPM | RESPIRATION RATE: 18 BRPM | OXYGEN SATURATION: 97 %

## 2024-07-03 PROCEDURE — G0299 HHS/HOSPICE OF RN EA 15 MIN: HCPCS

## 2024-07-08 ENCOUNTER — HOME CARE VISIT (OUTPATIENT)
Age: 79
End: 2024-07-08
Payer: MEDICARE

## 2024-07-08 VITALS
SYSTOLIC BLOOD PRESSURE: 145 MMHG | HEART RATE: 85 BPM | TEMPERATURE: 98.5 F | DIASTOLIC BLOOD PRESSURE: 86 MMHG | RESPIRATION RATE: 20 BRPM | OXYGEN SATURATION: 98 %

## 2024-07-08 PROCEDURE — G0299 HHS/HOSPICE OF RN EA 15 MIN: HCPCS

## 2024-07-08 NOTE — HOSPICE
Patient/Caregiver/Family findings/issues during SN visit:  pt feels \"great today\", no needs or concerns    Medication refills ordered this visit: none    Medications reconciled and all medications available in the home this visit.  The following education was provided regarding medications, medication interactions, and look alike medications: pt verbalized understanding. Medications are effective at this time.      Supplies by type and quantity ordered this visit include: none    Consulted medical director/attending physician regarding: n/a    Instructed patient/family/caregiver on 24-hour hospice availability and phone number.    Plan for next visit:  monitor pain

## 2024-07-09 ASSESSMENT — ENCOUNTER SYMPTOMS
DYSPNEA ACTIVITY LEVEL: AFTER AMBULATING LESS THAN 10 FT
CONTUSION: 1
SKIN LESIONS: 1

## 2024-07-09 NOTE — HOME HEALTH
Patient/Caregiver/Family findings/issues during SN visit:  pt became very weak yesterday, had to be put in w/c and taken to bed. Took 3 adults to get him in his bed. Pt awake and alert during visit, stated \"I just can't walk anymore\", increased to 3xwk    Medication refills ordered this visit: senna, macrobid    Medications reconciled and all medications are available in the home this visit.  The following education was provided regarding medications, medication interactions, and look alike medications.  Response to teaching: pt verbalized understanding. Medications are effective at this time.      Supplies by type and quantity ordered this visit include: hospital bed, BSC, OBT, chux, gloves, diapers, wipes, bedpan    Consulted medical director/attending physician regarding: possible UTI    Instructed patient/family/caregiver on 24-hour hospice availability and phone number.    Plan for next visit:  monitor urinary symptoms

## 2024-07-10 ENCOUNTER — HOME CARE VISIT (OUTPATIENT)
Age: 79
End: 2024-07-10
Payer: MEDICARE

## 2024-07-10 VITALS
RESPIRATION RATE: 18 BRPM | OXYGEN SATURATION: 98 % | HEART RATE: 81 BPM | TEMPERATURE: 97.9 F | SYSTOLIC BLOOD PRESSURE: 140 MMHG | DIASTOLIC BLOOD PRESSURE: 62 MMHG

## 2024-07-10 PROCEDURE — G0156 HHCP-SVS OF AIDE,EA 15 MIN: HCPCS

## 2024-07-10 PROCEDURE — G0299 HHS/HOSPICE OF RN EA 15 MIN: HCPCS

## 2024-07-11 ASSESSMENT — ENCOUNTER SYMPTOMS
DYSPNEA ACTIVITY LEVEL: AFTER AMBULATING LESS THAN 10 FT
SKIN LESIONS: 1

## 2024-07-11 NOTE — HOSPICE
Benny Rodas, 78 year old male, admitted to Hospice services for a terminal diagnosis of lung cancer.  Resides in his own home  Benefit Period 2  Code Status DNR    Changes since last re-certification: on admission pt lived alone and was independent of all ADL's. He was still able to drive and cook for himself as well. Now he is bedfast, 1-2 person stand and pivot transfer bed to w/c.    Appetite: on admission 3 meals daily with snacks, now 2-3 small snack daily i.e. milkshake, watermelon, or cantelope  Sleep: was sleeping 6-8hrs daily, now sleeps 10-12hrs  Function: was independent of alll ADL's and driving, now bedfast  Skin:intact  Cognitive Function: a&Ox4    Cardiac:irregular heartbeat with chest pain  Pulmonary:SOB with min exertion  Pain:chest and back  Increased Need for Services: family now taking shifts to stay with pt  Infections:UTI 7/8/24  Medications: macrobid 100mg BID x7D  Allergies: KNDA   LMAC: 30cm on admission NOW 28.8cm  PPS: 50% on admission, put upped to 70% on follow up visit, NOW 40%

## 2024-07-12 ENCOUNTER — HOME CARE VISIT (OUTPATIENT)
Age: 79
End: 2024-07-12
Payer: MEDICARE

## 2024-07-12 VITALS
DIASTOLIC BLOOD PRESSURE: 77 MMHG | RESPIRATION RATE: 18 BRPM | SYSTOLIC BLOOD PRESSURE: 149 MMHG | HEART RATE: 77 BPM | TEMPERATURE: 98.4 F

## 2024-07-12 PROCEDURE — G0156 HHCP-SVS OF AIDE,EA 15 MIN: HCPCS

## 2024-07-12 PROCEDURE — G0299 HHS/HOSPICE OF RN EA 15 MIN: HCPCS

## 2024-07-13 ENCOUNTER — HOME CARE VISIT (OUTPATIENT)
Age: 79
End: 2024-07-13
Payer: MEDICARE

## 2024-07-13 VITALS — TEMPERATURE: 98.4 F | HEART RATE: 88 BPM | RESPIRATION RATE: 20 BRPM | OXYGEN SATURATION: 93 %

## 2024-07-13 PROCEDURE — G0299 HHS/HOSPICE OF RN EA 15 MIN: HCPCS

## 2024-07-13 ASSESSMENT — ENCOUNTER SYMPTOMS
CONSTIPATION: 1
BOWEL INCONTINENCE: 1
SKIN LESIONS: 1

## 2024-07-13 NOTE — HOSPICE
SN PRN Visit  Patient is an 79 year old male admitted to services with diagnosis of Lung Cancer  Received call through service from daughter who stated that patient is weaker today, breathing has changed and incontinent of urine  On arrival patient sleeping, easy to arouse, RR 20 Oxygen sat 93% Lorazepam recently administered by daughter  Education regarding signs of imminence, changes to anticipate. Informed patient may be sleeping more due to side effects of medication, namely, Morphine and Lorazepam. Daughter verbalized understanding.  Esme-care provided, patient changed and reposition. No sign of pain or discomfort with turning and positioning.

## 2024-07-13 NOTE — HOSPICE
Patient/Caregiver/Family findings/issues during SN visit:  pt getting bathed by CNA, family concerned he is getting weaker. Pt denied pain.    Medication refills ordered this visit: none    Medications reconciled and all medications available in the home this visit.  The following education was provided regarding medications, medication interactions, and look alike medications: pt and family verbalized understanding.  Medications are effective at this time.      Supplies by type and quantity ordered this visit include: none    Consulted medical director/attending physician regarding: N/A    Instructed patient/family/caregiver on 24-hour hospice availability and phone number.    Plan for next visit:  monitor PO intake

## 2024-07-14 ENCOUNTER — HOME CARE VISIT (OUTPATIENT)
Age: 79
End: 2024-07-14
Payer: MEDICARE

## 2024-07-14 VITALS
RESPIRATION RATE: 16 BRPM | TEMPERATURE: 98.1 F | HEART RATE: 90 BPM | DIASTOLIC BLOOD PRESSURE: 80 MMHG | SYSTOLIC BLOOD PRESSURE: 149 MMHG | OXYGEN SATURATION: 98 %

## 2024-07-14 PROCEDURE — G0299 HHS/HOSPICE OF RN EA 15 MIN: HCPCS

## 2024-07-15 NOTE — HOSPICE
MSW places call for support visit, as pt is imminent. MSW speaks to dtr. Dtr reports that they are keeping pt comfortable and pt is sleeping most of the day. Dtr states that all family is grieving appropriately and denies needing a face to face visit. MSW provides support via phone and encourages dtr to call MSW if a need arises, dtr verbalizes understanding.

## 2024-07-16 NOTE — HOSPICE
Patient/Caregiver/Family findings/issues during SN visit:  no needs or concerns    Medication refills ordered this visit: none    Medications reconciled and all medications are available in the home this visit.  The following education was provided regarding medications, medication interactions, and look alike medications.  Response to teaching: verbalize understanding. Medications are  effective at this time.      Supplies by type and quantity ordered this visit include: none    Consulted medical director/attending physician regarding: N/A    Instructed patient/family/caregiver on 24-hour hospice availability and phone number.    Plan for next visit:  EOL support

## 2024-07-17 NOTE — HOSPICE
Patient/Caregiver/Family findings/issues during SN visit:  dtmoise Cates stated pt c/o neck pain last night, no other concerns    Medication refills ordered this visit: none    Medications reconciled and all medications are available in the home this visit.  The following education was provided regarding medications, medication interactions, and look alike medications.  Response to teaching: curt verbalized understanding. Medications are effective at this time.      Supplies by type and quantity ordered this visit include: none    Consulted medical director/attending physician regarding: N/A    Instructed patient/family/caregiver on 24-hour hospice availability and phone number.    Plan for next visit:  EOL support

## 2024-07-18 NOTE — HOSPICE
Patient/Caregiver/Family findings/issues during SN visit:  pt obtunded, family present    Medication refills ordered this visit: none    Medications reconciled and all medications are available in the home this visit.  The following education was provided regarding medications, medication interactions, and look alike medications.  Response to teaching: verbalized understanding. Medications are effective at this time.      Supplies by type and quantity ordered this visit include: none    Consulted medical director/attending physician regarding: N/A    Instructed patient/family/caregiver on 24-hour hospice availability and phone number.    Plan for next visit:  EOL support

## 2024-07-19 NOTE — HOSPICE
Patient/Caregiver/Family findings/issues during SN visit:  pt obtunded, increased secretions, family surrounding and appropriate    Medication refills ordered this visit: none    Medications reconciled and all medications are available in the home this visit.  The following education was provided regarding medications, medication interactions, and look alike medications.  Response to teaching: verbalized understanding. Medications are effective at this time.      Supplies by type and quantity ordered this visit include: none    Consulted medical director/attending physician regarding: N/A    Instructed patient/family/caregiver on 24-hour hospice availability and phone number.    Plan for next visit:  EOL support

## 2024-07-29 ENCOUNTER — HOME CARE VISIT (OUTPATIENT)
Age: 79
End: 2024-07-29
Payer: MEDICARE

## (undated) DEVICE — FLEX ADVANTAGE 1500CC: Brand: FLEX ADVANTAGE

## (undated) DEVICE — CLIPPING DEVICE: Brand: RESOLUTION CLIP

## (undated) DEVICE — MEDI-VAC NON-CONDUCTIVE SUCTION TUBING: Brand: CARDINAL HEALTH

## (undated) DEVICE — SNARE POLYP M W27MMXL240CM OVL STIFF DISP CAPTIVATOR

## (undated) DEVICE — FORCEPS BX L240CM JAW DIA2.8MM L CAP W/ NDL MIC MESH TOOTH

## (undated) DEVICE — Device

## (undated) DEVICE — CATH IV SAFE STR 22GX1IN BLU -- PROTECTIV PLUS

## (undated) DEVICE — AIRLIFE™ NASAL OXYGEN CANNULA CURVED, NONFLARED TIP WITH 14 FOOT (4.3 M) CRUSH-RESISTANT TUBING, OVER-THE-EAR STYLE: Brand: AIRLIFE™